# Patient Record
Sex: FEMALE | Race: WHITE | URBAN - METROPOLITAN AREA
[De-identification: names, ages, dates, MRNs, and addresses within clinical notes are randomized per-mention and may not be internally consistent; named-entity substitution may affect disease eponyms.]

---

## 2017-03-02 PROBLEM — D22.61 MELANOCYTIC NEVI OF RIGHT UPPER LIMB, INCLUDING SHOULDER: Status: ACTIVE | Noted: 2017-03-02

## 2017-03-02 PROBLEM — D22.5 MELANOCYTIC NEVI OF TRUNK: Status: ACTIVE | Noted: 2017-03-02

## 2017-03-02 PROBLEM — D22.71 MELANOCYTIC NEVI OF RIGHT LOWER LIMB, INCLUDING HIP: Status: ACTIVE | Noted: 2017-03-02

## 2017-03-02 PROBLEM — D22.72 MELANOCYTIC NEVI OF LEFT LOWER LIMB, INCLUDING HIP: Status: ACTIVE | Noted: 2017-03-02

## 2019-04-01 ENCOUNTER — APPOINTMENT (RX ONLY)
Dept: URBAN - METROPOLITAN AREA CLINIC 4 | Facility: CLINIC | Age: 64
Setting detail: DERMATOLOGY
End: 2019-04-01

## 2019-04-01 DIAGNOSIS — D18.0 HEMANGIOMA: ICD-10-CM

## 2019-04-01 DIAGNOSIS — L82.1 OTHER SEBORRHEIC KERATOSIS: ICD-10-CM

## 2019-04-01 DIAGNOSIS — L81.4 OTHER MELANIN HYPERPIGMENTATION: ICD-10-CM

## 2019-04-01 DIAGNOSIS — D22 MELANOCYTIC NEVI: ICD-10-CM

## 2019-04-01 PROBLEM — D22.71 MELANOCYTIC NEVI OF RIGHT LOWER LIMB, INCLUDING HIP: Status: ACTIVE | Noted: 2019-04-01

## 2019-04-01 PROBLEM — D22.72 MELANOCYTIC NEVI OF LEFT LOWER LIMB, INCLUDING HIP: Status: ACTIVE | Noted: 2019-04-01

## 2019-04-01 PROBLEM — D22.5 MELANOCYTIC NEVI OF TRUNK: Status: ACTIVE | Noted: 2019-04-01

## 2019-04-01 PROBLEM — D22.61 MELANOCYTIC NEVI OF RIGHT UPPER LIMB, INCLUDING SHOULDER: Status: ACTIVE | Noted: 2019-04-01

## 2019-04-01 PROBLEM — D18.01 HEMANGIOMA OF SKIN AND SUBCUTANEOUS TISSUE: Status: ACTIVE | Noted: 2019-04-01

## 2019-04-01 PROBLEM — D23.39 OTHER BENIGN NEOPLASM OF SKIN OF OTHER PARTS OF FACE: Status: ACTIVE | Noted: 2019-04-01

## 2019-04-01 PROBLEM — D22.62 MELANOCYTIC NEVI OF LEFT UPPER LIMB, INCLUDING SHOULDER: Status: ACTIVE | Noted: 2019-04-01

## 2019-04-01 PROCEDURE — 99213 OFFICE O/P EST LOW 20 MIN: CPT

## 2019-04-01 PROCEDURE — ? COUNSELING

## 2019-04-01 PROCEDURE — ? SUNSCREEN RECOMMENDATIONS

## 2019-04-01 ASSESSMENT — LOCATION SIMPLE DESCRIPTION DERM
LOCATION SIMPLE: LEFT HAND
LOCATION SIMPLE: LEFT THIGH
LOCATION SIMPLE: RIGHT THIGH
LOCATION SIMPLE: RIGHT HAND
LOCATION SIMPLE: RIGHT CHEEK
LOCATION SIMPLE: UPPER BACK
LOCATION SIMPLE: ABDOMEN
LOCATION SIMPLE: LEFT ANTERIOR NECK
LOCATION SIMPLE: LEFT FOREARM
LOCATION SIMPLE: LEFT UPPER BACK
LOCATION SIMPLE: RIGHT FOREARM
LOCATION SIMPLE: LEFT CHEEK
LOCATION SIMPLE: RIGHT POSTERIOR UPPER ARM
LOCATION SIMPLE: RIGHT LOWER BACK
LOCATION SIMPLE: LEFT POSTERIOR UPPER ARM

## 2019-04-01 ASSESSMENT — LOCATION DETAILED DESCRIPTION DERM
LOCATION DETAILED: PERIUMBILICAL SKIN
LOCATION DETAILED: LEFT INFERIOR ANTERIOR NECK
LOCATION DETAILED: LEFT SUPERIOR MEDIAL UPPER BACK
LOCATION DETAILED: SUPERIOR THORACIC SPINE
LOCATION DETAILED: RIGHT SUPERIOR MEDIAL MIDBACK
LOCATION DETAILED: RIGHT ANTERIOR PROXIMAL THIGH
LOCATION DETAILED: LEFT PROXIMAL DORSAL FOREARM
LOCATION DETAILED: RIGHT RIB CAGE
LOCATION DETAILED: RIGHT PROXIMAL DORSAL FOREARM
LOCATION DETAILED: RIGHT RADIAL DORSAL HAND
LOCATION DETAILED: LEFT ULNAR DORSAL HAND
LOCATION DETAILED: LEFT PROXIMAL POSTERIOR UPPER ARM
LOCATION DETAILED: LEFT CENTRAL MALAR CHEEK
LOCATION DETAILED: LEFT ANTERIOR PROXIMAL THIGH
LOCATION DETAILED: RIGHT DISTAL POSTERIOR UPPER ARM
LOCATION DETAILED: RIGHT CENTRAL MALAR CHEEK

## 2019-04-01 ASSESSMENT — LOCATION ZONE DERM
LOCATION ZONE: TRUNK
LOCATION ZONE: NECK
LOCATION ZONE: HAND
LOCATION ZONE: LEG
LOCATION ZONE: ARM
LOCATION ZONE: FACE

## 2019-04-01 NOTE — HPI: FULL BODY SKIN EXAMINATION
How Severe Are Your Spot(S)?: moderate
What Type Of Note Output Would You Prefer (Optional)?: Standard Output
What Is The Reason For Today's Visit?: Full Body Skin Examination
What Is The Reason For Today's Visit? (Being Monitored For X): concerning skin lesions on an annual basis
Additional History: Rough spot under left scapula for 5-6 months. FBE.

## 2019-10-08 ENCOUNTER — APPOINTMENT (RX ONLY)
Dept: URBAN - METROPOLITAN AREA CLINIC 4 | Facility: CLINIC | Age: 64
Setting detail: DERMATOLOGY
End: 2019-10-08

## 2019-10-08 DIAGNOSIS — D485 NEOPLASM OF UNCERTAIN BEHAVIOR OF SKIN: ICD-10-CM

## 2019-10-08 PROBLEM — D48.5 NEOPLASM OF UNCERTAIN BEHAVIOR OF SKIN: Status: ACTIVE | Noted: 2019-10-08

## 2019-10-08 PROCEDURE — ? BIOPSY BY SHAVE METHOD

## 2019-10-08 PROCEDURE — 11102 TANGNTL BX SKIN SINGLE LES: CPT

## 2019-10-08 ASSESSMENT — LOCATION DETAILED DESCRIPTION DERM: LOCATION DETAILED: RIGHT LATERAL EYEBROW

## 2019-10-08 ASSESSMENT — LOCATION SIMPLE DESCRIPTION DERM: LOCATION SIMPLE: RIGHT EYEBROW

## 2019-10-08 ASSESSMENT — LOCATION ZONE DERM: LOCATION ZONE: FACE

## 2019-10-08 NOTE — PROCEDURE: BIOPSY BY SHAVE METHOD
Billing Type: Third-Party Bill
Biopsy Method: Personna blade
Hemostasis: Drysol
Render In Bullet Format When Appropriate: No
Curettage Text: The wound bed was treated with curettage after the biopsy was performed.
Dressing: bandage
Was A Bandage Applied: Yes
Silver Nitrate Text: The wound bed was treated with silver nitrate after the biopsy was performed.
Anesthesia Type: 1% lidocaine with epinephrine
Lab Facility: 
Cryotherapy Text: The wound bed was treated with cryotherapy after the biopsy was performed.
Additional Anesthesia Volume In Cc (Will Not Render If 0): 0
Lab: [id number]
Detail Level: Detailed
Post-Care Instructions: I reviewed with the patient in detail post-care instructions. Patient is to keep the biopsy site dry overnight, and then apply bacitracin twice daily until healed. Patient may apply hydrogen peroxide soaks to remove any crusting.
Electrodesiccation Text: The wound bed was treated with electrodesiccation after the biopsy was performed.
X Size Of Lesion In Cm: 0.1
Consent: Written consent was obtained and risks were reviewed including but not limited to scarring, infection, bleeding, scabbing, incomplete removal, nerve damage and allergy to anesthesia.
Depth Of Biopsy: dermis
Notification Instructions: Patient will be notified of biopsy results. However, patient instructed to call the office if not contacted within 2 weeks.
Size Of Lesion In Cm: 0.2
Wound Care: Vaseline
Biopsy Type: H and E
Electrodesiccation And Curettage Text: The wound bed was treated with electrodesiccation and curettage after the biopsy was performed.
Anesthesia Volume In Cc: 2
Type Of Destruction Used: Curettage

## 2019-10-08 NOTE — HPI: SKIN LESION
Is This A New Presentation, Or A Follow-Up?: Skin Lesion
What Type Of Note Output Would You Prefer (Optional)?: Bullet Format
How Severe Is Your Skin Lesion?: moderate
Has Your Skin Lesion Been Treated?: not been treated
Additional History: Patient presents with concerns about a lesion above her right eyebrow. She states it is painful, bumpy. Has been there for a year. Has a history of BCC Dorsal nose 2016.

## 2019-11-05 ENCOUNTER — APPOINTMENT (RX ONLY)
Dept: URBAN - METROPOLITAN AREA CLINIC 4 | Facility: CLINIC | Age: 64
Setting detail: DERMATOLOGY
End: 2019-11-05

## 2019-11-05 DIAGNOSIS — L57.0 ACTINIC KERATOSIS: ICD-10-CM

## 2019-11-05 PROCEDURE — ? COUNSELING

## 2019-11-05 PROCEDURE — 99212 OFFICE O/P EST SF 10 MIN: CPT

## 2019-11-05 ASSESSMENT — LOCATION ZONE DERM: LOCATION ZONE: FACE

## 2019-11-05 ASSESSMENT — LOCATION SIMPLE DESCRIPTION DERM: LOCATION SIMPLE: RIGHT EYEBROW

## 2019-11-05 ASSESSMENT — LOCATION DETAILED DESCRIPTION DERM: LOCATION DETAILED: RIGHT LATERAL EYEBROW

## 2019-11-05 NOTE — PROCEDURE: COUNSELING
Detail Level: Zone
Patient Specific Counseling (Will Not Stick From Patient To Patient): Site is healing well. There is some residual scale. We will treat with LN2.

## 2021-07-05 ENCOUNTER — APPOINTMENT (RX ONLY)
Dept: URBAN - METROPOLITAN AREA CLINIC 4 | Facility: CLINIC | Age: 66
Setting detail: DERMATOLOGY
End: 2021-07-05

## 2021-07-05 DIAGNOSIS — Z71.89 OTHER SPECIFIED COUNSELING: ICD-10-CM

## 2021-07-05 DIAGNOSIS — L82.0 INFLAMED SEBORRHEIC KERATOSIS: ICD-10-CM

## 2021-07-05 DIAGNOSIS — L57.0 ACTINIC KERATOSIS: ICD-10-CM

## 2021-07-05 DIAGNOSIS — L81.4 OTHER MELANIN HYPERPIGMENTATION: ICD-10-CM

## 2021-07-05 DIAGNOSIS — D18.0 HEMANGIOMA: ICD-10-CM

## 2021-07-05 DIAGNOSIS — L82.1 OTHER SEBORRHEIC KERATOSIS: ICD-10-CM

## 2021-07-05 DIAGNOSIS — D22 MELANOCYTIC NEVI: ICD-10-CM

## 2021-07-05 PROBLEM — D22.9 MELANOCYTIC NEVI, UNSPECIFIED: Status: ACTIVE | Noted: 2021-07-05

## 2021-07-05 PROBLEM — D18.01 HEMANGIOMA OF SKIN AND SUBCUTANEOUS TISSUE: Status: ACTIVE | Noted: 2021-07-05

## 2021-07-05 PROCEDURE — ? LIQUID NITROGEN

## 2021-07-05 PROCEDURE — 17003 DESTRUCT PREMALG LES 2-14: CPT

## 2021-07-05 PROCEDURE — ? LIQUID NITROGEN (COSMETIC)

## 2021-07-05 PROCEDURE — 17000 DESTRUCT PREMALG LESION: CPT

## 2021-07-05 PROCEDURE — ? SUNSCREEN RECOMMENDATIONS

## 2021-07-05 PROCEDURE — 99213 OFFICE O/P EST LOW 20 MIN: CPT | Mod: 25

## 2021-07-05 PROCEDURE — ? COUNSELING

## 2021-07-05 ASSESSMENT — LOCATION SIMPLE DESCRIPTION DERM
LOCATION SIMPLE: RIGHT HAND
LOCATION SIMPLE: CHEST
LOCATION SIMPLE: LEFT SCALP
LOCATION SIMPLE: POSTERIOR SCALP
LOCATION SIMPLE: RIGHT CLAVICULAR SKIN
LOCATION SIMPLE: LEFT CHEEK
LOCATION SIMPLE: LEFT FOREHEAD

## 2021-07-05 ASSESSMENT — LOCATION ZONE DERM
LOCATION ZONE: FACE
LOCATION ZONE: SCALP
LOCATION ZONE: TRUNK
LOCATION ZONE: HAND

## 2021-07-05 ASSESSMENT — LOCATION DETAILED DESCRIPTION DERM
LOCATION DETAILED: LEFT MEDIAL FRONTAL SCALP
LOCATION DETAILED: UPPER STERNUM
LOCATION DETAILED: LEFT CENTRAL MALAR CHEEK
LOCATION DETAILED: LEFT LATERAL SUPERIOR CHEST
LOCATION DETAILED: RIGHT CLAVICULAR SKIN
LOCATION DETAILED: LEFT SUPERIOR FOREHEAD
LOCATION DETAILED: RIGHT OCCIPITAL SCALP
LOCATION DETAILED: RIGHT RADIAL DORSAL HAND

## 2021-07-05 NOTE — PROCEDURE: LIQUID NITROGEN (COSMETIC)
Render Post-Care Instructions In Note?: no
Consent: The patient's consent was obtained including but not limited to risks of crusting, scabbing, blistering, scarring, darker or lighter pigmentary change, recurrence, incomplete removal and infection. The patient understands that the procedure is cosmetic in nature and is not covered by insurance.
Detail Level: Detailed
Billing Information: Bill by Static Price
Post-Care Instructions: I reviewed with the patient in detail post-care instructions. Patient is to wear sunprotection, and avoid picking at any of the treated lesions. Pt may apply Vaseline to crusted or scabbing areas.

## 2021-09-22 ENCOUNTER — APPOINTMENT (RX ONLY)
Dept: URBAN - METROPOLITAN AREA CLINIC 20 | Facility: CLINIC | Age: 66
Setting detail: DERMATOLOGY
End: 2021-09-22

## 2021-09-22 DIAGNOSIS — L57.0 ACTINIC KERATOSIS: ICD-10-CM

## 2021-09-22 PROCEDURE — 17000 DESTRUCT PREMALG LESION: CPT

## 2021-09-22 PROCEDURE — ? LIQUID NITROGEN

## 2021-09-22 PROCEDURE — ? COUNSELING

## 2021-09-22 PROCEDURE — 17003 DESTRUCT PREMALG LES 2-14: CPT

## 2021-09-22 ASSESSMENT — LOCATION ZONE DERM: LOCATION ZONE: FACE

## 2021-09-22 ASSESSMENT — LOCATION DETAILED DESCRIPTION DERM
LOCATION DETAILED: RIGHT LATERAL EYEBROW
LOCATION DETAILED: LEFT FOREHEAD

## 2021-09-22 ASSESSMENT — LOCATION SIMPLE DESCRIPTION DERM
LOCATION SIMPLE: LEFT FOREHEAD
LOCATION SIMPLE: RIGHT EYEBROW

## 2021-09-22 NOTE — PROCEDURE: LIQUID NITROGEN
Consent: The patient's consent was obtained including but not limited to risks of crusting, scabbing, blistering, scarring, darker or lighter pigmentary change, recurrence, incomplete removal and infection.
Detail Level: Detailed
Render Note In Bullet Format When Appropriate: No
Duration Of Freeze Thaw-Cycle (Seconds): 0
Post-Care Instructions: I reviewed with the patient in detail post-care instructions. Patient is to wear sunprotection, and avoid picking at any of the treated lesions. Pt may apply Vaseline to crusted or scabbing areas.
Show Aperture Variable?: Yes

## 2021-09-22 NOTE — HPI: SKIN LESIONS
Is This A New Presentation, Or A Follow-Up?: Follow Up Skin Lesions
How Severe Is Your Skin Lesion?: mild
Have Your Skin Lesions Been Treated?: been treated
Which Family Member (Optional)?: Sister

## 2021-11-19 ENCOUNTER — TELEPHONE (OUTPATIENT)
Dept: HEALTH INFORMATION MANAGEMENT | Facility: OTHER | Age: 66
End: 2021-11-19

## 2022-02-23 ENCOUNTER — TELEPHONE (OUTPATIENT)
Dept: SCHEDULING | Facility: IMAGING CENTER | Age: 67
End: 2022-02-23

## 2022-03-11 ENCOUNTER — OFFICE VISIT (OUTPATIENT)
Dept: MEDICAL GROUP | Facility: PHYSICIAN GROUP | Age: 67
End: 2022-03-11
Payer: MEDICARE

## 2022-03-11 VITALS
HEART RATE: 75 BPM | SYSTOLIC BLOOD PRESSURE: 130 MMHG | WEIGHT: 153.2 LBS | BODY MASS INDEX: 23.22 KG/M2 | HEIGHT: 68 IN | DIASTOLIC BLOOD PRESSURE: 80 MMHG | TEMPERATURE: 97.7 F | OXYGEN SATURATION: 100 % | RESPIRATION RATE: 15 BRPM

## 2022-03-11 DIAGNOSIS — Z13.220 SCREENING CHOLESTEROL LEVEL: ICD-10-CM

## 2022-03-11 DIAGNOSIS — Z86.2 H/O SARCOIDOSIS: ICD-10-CM

## 2022-03-11 DIAGNOSIS — G44.049 CHRONIC PAROXYSMAL HEMICRANIA, NOT INTRACTABLE: ICD-10-CM

## 2022-03-11 DIAGNOSIS — Z23 NEED FOR VACCINATION: ICD-10-CM

## 2022-03-11 DIAGNOSIS — I10 ESSENTIAL HYPERTENSION: ICD-10-CM

## 2022-03-11 DIAGNOSIS — E55.9 VITAMIN D DEFICIENCY: ICD-10-CM

## 2022-03-11 DIAGNOSIS — H53.9 VISUAL CHANGES: ICD-10-CM

## 2022-03-11 PROCEDURE — 90732 PPSV23 VACC 2 YRS+ SUBQ/IM: CPT | Performed by: INTERNAL MEDICINE

## 2022-03-11 PROCEDURE — G0009 ADMIN PNEUMOCOCCAL VACCINE: HCPCS | Performed by: INTERNAL MEDICINE

## 2022-03-11 PROCEDURE — 99203 OFFICE O/P NEW LOW 30 MIN: CPT | Mod: 25 | Performed by: INTERNAL MEDICINE

## 2022-03-11 RX ORDER — LOSARTAN POTASSIUM 100 MG/1
100 TABLET ORAL
COMMUNITY
Start: 2022-01-31 | End: 2022-05-03 | Stop reason: SDUPTHER

## 2022-03-11 RX ORDER — LOSARTAN POTASSIUM 100 MG/1
TABLET ORAL
COMMUNITY
Start: 2021-11-04 | End: 2022-03-11

## 2022-03-11 ASSESSMENT — PATIENT HEALTH QUESTIONNAIRE - PHQ9: CLINICAL INTERPRETATION OF PHQ2 SCORE: 0

## 2022-03-11 NOTE — LETTER
[facility]  Radha Guerrero M.D.  [address]  [place] [address]  Fax: [phone]   Authorization for Release/Disclosure of   Protected Health Information   Name: YAZ LANIER : 1955 SSN: [id number]   Address: [address] [place] [address]  [place] [address] Phone:    [phone] (home)    I authorize the entity listed below to release/disclose the PHI below to:   [facility]/Radha Guerrero M.D. and Radha Guerrero M.D.   Provider or Entity Name:  [facility]    Address   City, State, Zip   Phone:      Fax:     Reason for request: continuity of care   Information to be released:    [  ] LAST COLONOSCOPY,  including any PATH REPORT and follow-up  [  ] LAST FIT/COLOGUARD RESULT [  ] LAST DEXA  [  ] LAST MAMMOGRAM  [  ] LAST PAP  [  ] LAST LABS [  ] RETINA EXAM REPORT  [  ] IMMUNIZATION RECORDS  [ xxx ] Release all info      [  ] Check here and initial the line next to each item to release ALL health information INCLUDING  _____ Care and treatment for drug and / or alcohol abuse  _____ HIV testing, infection status, or AIDS  _____ Genetic Testing    DATES OF SERVICE OR TIME PERIOD TO BE DISCLOSED: _____________  I understand and acknowledge that:  * This Authorization may be revoked at any time by you in writing, except if your health information has already been used or disclosed.  * Your health information that will be used or disclosed as a result of you signing this authorization could be re-disclosed by the recipient. If this occurs, your re-disclosed health information may no longer be protected by State or Federal laws.  * You may refuse to sign this Authorization. Your refusal will not affect your ability to obtain treatment.  * This Authorization becomes effective upon signing and will  on (date) __________.      If no date is indicated, this Authorization will  one (1) year from the signature date.    Name: Yaz Lanier    Signature:Continuity of care   Date:      3/11/2022       PLEASE FAX REQUESTED RECORDS BACK TO: [phone]

## 2022-03-25 ENCOUNTER — HOSPITAL ENCOUNTER (OUTPATIENT)
Dept: LAB | Facility: MEDICAL CENTER | Age: 67
End: 2022-03-25
Attending: INTERNAL MEDICINE
Payer: MEDICARE

## 2022-03-25 DIAGNOSIS — E55.9 VITAMIN D DEFICIENCY: ICD-10-CM

## 2022-03-25 DIAGNOSIS — I10 ESSENTIAL HYPERTENSION: ICD-10-CM

## 2022-03-25 DIAGNOSIS — G44.049 CHRONIC PAROXYSMAL HEMICRANIA, NOT INTRACTABLE: ICD-10-CM

## 2022-03-25 DIAGNOSIS — Z13.220 SCREENING CHOLESTEROL LEVEL: ICD-10-CM

## 2022-03-25 LAB
25(OH)D3 SERPL-MCNC: 50 NG/ML (ref 30–100)
ALBUMIN SERPL BCP-MCNC: 4.7 G/DL (ref 3.2–4.9)
ALBUMIN/GLOB SERPL: 1.7 G/DL
ALP SERPL-CCNC: 70 U/L (ref 30–99)
ALT SERPL-CCNC: 37 U/L (ref 2–50)
ANION GAP SERPL CALC-SCNC: 12 MMOL/L (ref 7–16)
AST SERPL-CCNC: 25 U/L (ref 12–45)
BASOPHILS # BLD AUTO: 1.2 % (ref 0–1.8)
BASOPHILS # BLD: 0.07 K/UL (ref 0–0.12)
BILIRUB SERPL-MCNC: 0.5 MG/DL (ref 0.1–1.5)
BUN SERPL-MCNC: 20 MG/DL (ref 8–22)
CALCIUM SERPL-MCNC: 9.6 MG/DL (ref 8.4–10.2)
CHLORIDE SERPL-SCNC: 104 MMOL/L (ref 96–112)
CHOLEST SERPL-MCNC: 195 MG/DL (ref 100–199)
CO2 SERPL-SCNC: 26 MMOL/L (ref 20–33)
CREAT SERPL-MCNC: 0.85 MG/DL (ref 0.5–1.4)
CRP SERPL HS-MCNC: 0.48 MG/DL (ref 0–0.75)
EOSINOPHIL # BLD AUTO: 0.21 K/UL (ref 0–0.51)
EOSINOPHIL NFR BLD: 3.5 % (ref 0–6.9)
ERYTHROCYTE [DISTWIDTH] IN BLOOD BY AUTOMATED COUNT: 41.2 FL (ref 35.9–50)
ERYTHROCYTE [SEDIMENTATION RATE] IN BLOOD BY WESTERGREN METHOD: 17 MM/HOUR (ref 0–25)
FASTING STATUS PATIENT QL REPORTED: NORMAL
GFR SERPLBLD CREATININE-BSD FMLA CKD-EPI: 76 ML/MIN/1.73 M 2
GLOBULIN SER CALC-MCNC: 2.7 G/DL (ref 1.9–3.5)
GLUCOSE SERPL-MCNC: 117 MG/DL (ref 65–99)
HCT VFR BLD AUTO: 44.5 % (ref 37–47)
HDLC SERPL-MCNC: 62 MG/DL
HGB BLD-MCNC: 14.6 G/DL (ref 12–16)
IMM GRANULOCYTES # BLD AUTO: 0.02 K/UL (ref 0–0.11)
IMM GRANULOCYTES NFR BLD AUTO: 0.3 % (ref 0–0.9)
LDLC SERPL CALC-MCNC: 114 MG/DL
LYMPHOCYTES # BLD AUTO: 2.15 K/UL (ref 1–4.8)
LYMPHOCYTES NFR BLD: 35.5 % (ref 22–41)
MCH RBC QN AUTO: 30 PG (ref 27–33)
MCHC RBC AUTO-ENTMCNC: 32.8 G/DL (ref 33.6–35)
MCV RBC AUTO: 91.4 FL (ref 81.4–97.8)
MONOCYTES # BLD AUTO: 0.45 K/UL (ref 0–0.85)
MONOCYTES NFR BLD AUTO: 7.4 % (ref 0–13.4)
NEUTROPHILS # BLD AUTO: 3.16 K/UL (ref 2–7.15)
NEUTROPHILS NFR BLD: 52.1 % (ref 44–72)
NRBC # BLD AUTO: 0 K/UL
NRBC BLD-RTO: 0 /100 WBC
PLATELET # BLD AUTO: 301 K/UL (ref 164–446)
PMV BLD AUTO: 9.8 FL (ref 9–12.9)
POTASSIUM SERPL-SCNC: 4 MMOL/L (ref 3.6–5.5)
PROT SERPL-MCNC: 7.4 G/DL (ref 6–8.2)
RBC # BLD AUTO: 4.87 M/UL (ref 4.2–5.4)
SODIUM SERPL-SCNC: 142 MMOL/L (ref 135–145)
TRIGL SERPL-MCNC: 94 MG/DL (ref 0–149)
TSH SERPL DL<=0.005 MIU/L-ACNC: 0.81 UIU/ML (ref 0.38–5.33)
WBC # BLD AUTO: 6.1 K/UL (ref 4.8–10.8)

## 2022-03-25 PROCEDURE — 80061 LIPID PANEL: CPT

## 2022-03-25 PROCEDURE — 85652 RBC SED RATE AUTOMATED: CPT

## 2022-03-25 PROCEDURE — 82306 VITAMIN D 25 HYDROXY: CPT

## 2022-03-25 PROCEDURE — 36415 COLL VENOUS BLD VENIPUNCTURE: CPT

## 2022-03-25 PROCEDURE — 86140 C-REACTIVE PROTEIN: CPT

## 2022-03-25 PROCEDURE — 85025 COMPLETE CBC W/AUTO DIFF WBC: CPT

## 2022-03-25 PROCEDURE — 80053 COMPREHEN METABOLIC PANEL: CPT

## 2022-03-25 PROCEDURE — 84443 ASSAY THYROID STIM HORMONE: CPT

## 2022-04-05 ENCOUNTER — OFFICE VISIT (OUTPATIENT)
Dept: MEDICAL GROUP | Facility: PHYSICIAN GROUP | Age: 67
End: 2022-04-05
Payer: MEDICARE

## 2022-04-05 VITALS
TEMPERATURE: 97 F | BODY MASS INDEX: 22.87 KG/M2 | HEART RATE: 92 BPM | SYSTOLIC BLOOD PRESSURE: 130 MMHG | RESPIRATION RATE: 15 BRPM | DIASTOLIC BLOOD PRESSURE: 70 MMHG | HEIGHT: 68 IN | OXYGEN SATURATION: 98 % | WEIGHT: 150.9 LBS

## 2022-04-05 DIAGNOSIS — I10 ESSENTIAL HYPERTENSION: ICD-10-CM

## 2022-04-05 DIAGNOSIS — G44.049 CHRONIC PAROXYSMAL HEMICRANIA, NOT INTRACTABLE: ICD-10-CM

## 2022-04-05 DIAGNOSIS — R73.9 HYPERGLYCEMIA: ICD-10-CM

## 2022-04-05 LAB
HBA1C MFR BLD: 6 % (ref 0–5.6)
INT CON NEG: NEGATIVE
INT CON POS: POSITIVE

## 2022-04-05 PROCEDURE — 83036 HEMOGLOBIN GLYCOSYLATED A1C: CPT | Performed by: INTERNAL MEDICINE

## 2022-04-05 PROCEDURE — 99214 OFFICE O/P EST MOD 30 MIN: CPT | Performed by: INTERNAL MEDICINE

## 2022-04-05 RX ORDER — HYDROCHLOROTHIAZIDE 12.5 MG/1
12.5 TABLET ORAL DAILY
Qty: 100 TABLET | Refills: 0 | Status: SHIPPED | OUTPATIENT
Start: 2022-04-05 | End: 2022-06-07

## 2022-04-05 ASSESSMENT — FIBROSIS 4 INDEX: FIB4 SCORE: 0.89

## 2022-04-05 NOTE — PROGRESS NOTES
CC: Follow-up lab work    HPI:  Yaz presents with the following    1. Chronic paroxysmal hemicrania, not intractable  Patient has a complaints of chronic headache that started about 1 year ago.  Patient denies a history of migraines or seizures.  Patient completed brain MRI which was negative.  Possible etiology from her cervical spine, therefore she completed 2 months of physical therapy.  Therapy did not resolve her complaints.  She describes the headache as frontal in between her eyes.  Headaches are most prominent upon waking up in the morning.  She believes she keeps well-hydrated.  No issues in the past with hypoglycemia.  Headaches resolve with over-the-counter pain relievers.  Patient denies any chest pain, shortness of breath, dizziness, palpitations or other focal neurologic symptoms.  Patient does snore at nighttime but states that she gets restful sleep.  She has never been evaluated by neurology.  Patient denies any sinus or allergy symptoms.  Patient saw her ophthalmologist and was diagnosed with possible macular degeneration.     4/5/2022:.  Mild to moderate headache still persists.  Patient believes it is secondary to receiving new glasses.  Patient has a follow-up appointment with ophthalmology on April 25.  CBC, CMP, lipid panel, vitamin D, thyroid function panel, ESR within good range    2. Essential hypertension  Patient currently takes Cozaar 100 mg tablets daily.  Patient has been keeping a blood pressure log.  Average blood pressure 145/70-80.     3. Hyperglycemia  Fasting blood sugar noted 197.  No PMH of DM.  Hemoglobin A1c 6.0.      Patient Active Problem List    Diagnosis Date Noted   • Essential hypertension 03/11/2022   • Chronic paroxysmal hemicrania, not intractable 03/11/2022   • H/O sarcoidosis 03/11/2022   • Vitamin D deficiency 03/11/2022   • Visual changes 03/11/2022       Current Outpatient Medications   Medication Sig Dispense Refill   • hydroCHLOROthiazide (HYDRODIURIL)  "12.5 MG tablet Take 1 Tablet by mouth every day. 100 Tablet 0   • losartan (COZAAR) 100 MG Tab Take 100 mg by mouth every day.       No current facility-administered medications for this visit.         Allergies as of 04/05/2022 - Reviewed 04/05/2022   Allergen Reaction Noted   • Iodine Hives 03/11/2022   • Penicillins Hives 03/11/2022        Social History     Socioeconomic History   • Marital status:      Spouse name: Not on file   • Number of children: Not on file   • Years of education: Not on file   • Highest education level: Not on file   Occupational History   • Not on file   Tobacco Use   • Smoking status: Never Smoker   • Smokeless tobacco: Never Used   Vaping Use   • Vaping Use: Never used   Substance and Sexual Activity   • Alcohol use: Yes     Comment: OCC   • Drug use: Never   • Sexual activity: Not on file   Other Topics Concern   • Not on file   Social History Narrative   • Not on file     Social Determinants of Health     Financial Resource Strain: Not on file   Food Insecurity: Not on file   Transportation Needs: Not on file   Physical Activity: Not on file   Stress: Not on file   Social Connections: Not on file   Intimate Partner Violence: Not on file   Housing Stability: Not on file       History reviewed. No pertinent family history.    History reviewed. No pertinent surgical history.    ROS: Positive ROS per HPI.  Denies Chest pain,  Shortness of breath,  Abdominal pain, Changes of bowel or bladder, Lower ext edema, Fevers, Nights sweats, Weight Changes, Focal weakness or numbness.  All other systems are negative.    /70 (BP Location: Right arm, Patient Position: Sitting, BP Cuff Size: Adult)   Pulse 92   Temp 36.1 °C (97 °F) (Temporal)   Resp 15   Ht 1.727 m (5' 8\")   Wt 68.4 kg (150 lb 14.4 oz)   SpO2 98%   BMI 22.94 kg/m²      Constitutional: Alert, no distress, well-groomed.  Skin: Warm, dry, good turgor, no rashes in visible areas.  Eye: Equal, round and reactive, " conjunctiva clear, lids normal.  ENMT: Lips without lesions, good dentition, moist mucous membranes.  Neck: Trachea midline, no masses, no thyromegaly.  Respiratory: Unlabored respiratory effort, no cough.  Abdomen: Soft, no gross masses.  MSK: Normal gait, moves all extremities.  Neuro: Grossly non-focal. No cranial nerve deficit. Strength and sensation intact.   Psych: Alert and oriented x3, normal affect and mood.      Assessment and Plan.   66 y.o. female presenting with the following.     1. Chronic paroxysmal hemicrania, not intractable  Patient to keep follow-up appointment with ophthalmology.  Better control of hypertension.  Consider neurology referral.    2. Essential hypertension  Continue Cozaar 100 mg tablets daily.  Add hydrochlorothiazide 12.5 mg tablet daily.  Keep blood pressure log.  RTC 6 weeks.    3. Hyperglycemia  Prediabetes counseled patient on diet and lifestyle modifications.  Continue to monitor.    - POCT  A1C    My total time spent caring for the patient on the day of the encounter was 30 minutes.   This does not include time spent on separately billable procedures/tests.

## 2022-04-05 NOTE — PROGRESS NOTES
CC: Establish medical care.  Previous patient of Dr. Neri.    HPI:  Yaz presents with the following    1. Need for vaccination  Due for Pneumovax    2. Essential hypertension  Patient diagnosed with hypertension about 2 years ago.  Currently taking Cozaar 100 mg tablets daily she had side effects of lisinopril with a cough.  Blood pressure has been stable however does not check blood pressure regularly.    3. Chronic paroxysmal hemicrania, not intractable  Patient has a complaints of chronic headache that started about 1 year ago.  Patient denies a history of migraines or seizures.  Patient completed brain MRI which was negative.  Possible etiology from her cervical spine, therefore she completed 2 months of physical therapy.  Therapy did not resolve her complaints.  She describes the headache as frontal in between her eyes.  Headaches are most prominent upon waking up in the morning.  She believes she keeps well-hydrated.  No issues in the past with hypoglycemia.  Headaches resolve with over-the-counter pain relievers.  Patient denies any chest pain, shortness of breath, dizziness, palpitations or other focal neurologic symptoms.  Patient does snore at nighttime but states that she gets restful sleep.  She has never been evaluated by neurology.  Patient denies any sinus or allergy symptoms.  Patient saw her ophthalmologist and was diagnosed with possible macular degeneration.    Patient is due for lab work.    4. H/O sarcoidosis  Patient has a remote history of sarcoidosis which affected her lungs in her early 30s.  Patient clinically resolved with no further issues.  Patient was never treated.    5. Visual changes  Patient would like referral to ophthalmology for second opinion.  Patient initially saw Dr. Mercedes.    6.  Preventative  Patient due for lab work  Up-to-date with mammogram per Dr. Khan, GYN  Up-to-date with bone density completed in the fall 2021.  Colonoscopy completed 2017, 10-year follow-up  1238 Pt arrived to PACU. Pt tearful. Denies pain. Abd soft. No drainage on peripad. 1310 Pt alert and oriented. VSS. Pt comfortable. Scant bloody drainage on peripad. Pt meets discharge criteria. recommended.    Patient Active Problem List    Diagnosis Date Noted   • Essential hypertension 03/11/2022   • Chronic paroxysmal hemicrania, not intractable 03/11/2022   • H/O sarcoidosis 03/11/2022   • Vitamin D deficiency 03/11/2022   • Visual changes 03/11/2022       Current Outpatient Medications   Medication Sig Dispense Refill   • losartan (COZAAR) 100 MG Tab      • losartan (COZAAR) 100 MG Tab Take 100 mg by mouth every day.       No current facility-administered medications for this visit.         Allergies as of 03/11/2022 - Reviewed 03/11/2022   Allergen Reaction Noted   • Iodine Hives 03/11/2022   • Penicillins Hives 03/11/2022        Social History     Socioeconomic History   • Marital status:      Spouse name: Not on file   • Number of children: Not on file   • Years of education: Not on file   • Highest education level: Not on file   Occupational History   • Not on file   Tobacco Use   • Smoking status: Never Smoker   • Smokeless tobacco: Never Used   Vaping Use   • Vaping Use: Never used   Substance and Sexual Activity   • Alcohol use: Yes     Comment: OCC   • Drug use: Never   • Sexual activity: Not on file   Other Topics Concern   • Not on file   Social History Narrative   • Not on file     Social Determinants of Health     Financial Resource Strain: Not on file   Food Insecurity: Not on file   Transportation Needs: Not on file   Physical Activity: Not on file   Stress: Not on file   Social Connections: Not on file   Intimate Partner Violence: Not on file   Housing Stability: Not on file       History reviewed. No pertinent family history.    History reviewed. No pertinent surgical history.    ROS: Positive ROS per HPI.  Denies any Headache,Chest pain,  Shortness of breath,  Abdominal pain, Changes of bowel or bladder, Lower ext edema, Fevers, Nights sweats, Weight Changes, Focal weakness or numbness.  All other systems are negative.    /80 (BP Location: Left arm, Patient Position:  "Sitting, BP Cuff Size: Adult)   Pulse 75   Temp 36.5 °C (97.7 °F) (Temporal)   Resp 15   Ht 1.727 m (5' 8\")   Wt 69.5 kg (153 lb 3.2 oz)   SpO2 100%   BMI 23.29 kg/m²      Constitutional: Alert, no distress, well-groomed.  Skin: Warm, dry, good turgor, no rashes in visible areas.  Eye: Equal, round and reactive, conjunctiva clear, lids normal.  ENMT: Lips without lesions, good dentition, moist mucous membranes.  Neck: Trachea midline, no masses, no thyromegaly.  Respiratory: Unlabored respiratory effort, no cough.  Abdomen: Soft, no gross masses.  MSK: Normal gait, moves all extremities.  Neuro: Grossly non-focal. No cranial nerve deficit. Strength and sensation intact.   Psych: Alert and oriented x3, normal affect and mood.      Assessment and Plan.   66 y.o. female presenting with the following.     1. Need for vaccination    - Pneumovax Vaccine (PPSV23)    2. Essential hypertension  Continue Cozaar 100 mg daily.  Recommend to keep blood pressure log for follow-up visit.    - Comp Metabolic Panel; Future    3. Chronic paroxysmal hemicrania, not intractable  Obtain results of imaging for review.  Complete lab work ordered.  Consider work-up SYMONE  Consider neurology referral  Referral to ophthalmology    - CBC WITH DIFFERENTIAL; Future  - TSH; Future  - Sed Rate; Future  - CRP QUANTITIVE (NON-CARDIAC); Future    4. H/O sarcoidosis  Resolved.  Continue to monitor    5. Vitamin D deficiency    - VITAMIN D,25 HYDROXY; Future    6. Screening cholesterol level    - Lipid Profile; Future    7. Visual changes    - Referral to Ophthalmology    My total time spent caring for the patient on the day of the encounter was 30 minutes.   This does not include time spent on separately billable procedures/tests.    "

## 2022-04-14 ENCOUNTER — PATIENT MESSAGE (OUTPATIENT)
Dept: HEALTH INFORMATION MANAGEMENT | Facility: OTHER | Age: 67
End: 2022-04-14

## 2022-04-26 ENCOUNTER — TELEPHONE (OUTPATIENT)
Dept: MEDICAL GROUP | Facility: PHYSICIAN GROUP | Age: 67
End: 2022-04-26
Payer: MEDICARE

## 2022-04-26 NOTE — TELEPHONE ENCOUNTER
VOICEMAIL  1. Caller Name: Yaz Naylor                        Call Back Number: [phone] (home)       2. Message: Pt called and LVM.Pt stopped taking Diuretic RX. Pt mentioned medication not making her feel well, pt took it for one week. Pt wanted to let pcp know.     3. Patient approves office to leave a detailed voicemail/MyChart message: N\A

## 2022-05-03 ENCOUNTER — PATIENT MESSAGE (OUTPATIENT)
Dept: MEDICAL GROUP | Facility: PHYSICIAN GROUP | Age: 67
End: 2022-05-03
Payer: MEDICARE

## 2022-05-03 RX ORDER — LOSARTAN POTASSIUM 100 MG/1
100 TABLET ORAL
Qty: 100 TABLET | Refills: 0 | Status: SHIPPED | OUTPATIENT
Start: 2022-05-03 | End: 2022-08-10

## 2022-05-04 NOTE — TELEPHONE ENCOUNTER
Requested Prescriptions     Pending Prescriptions Disp Refills   • losartan (COZAAR) 100 MG Tab 100 Tablet 0     Sig: Take 1 Tablet by mouth every day.   Caryn Browning M.D.

## 2022-06-07 ENCOUNTER — OFFICE VISIT (OUTPATIENT)
Dept: MEDICAL GROUP | Facility: PHYSICIAN GROUP | Age: 67
End: 2022-06-07
Payer: MEDICARE

## 2022-06-07 VITALS
DIASTOLIC BLOOD PRESSURE: 80 MMHG | BODY MASS INDEX: 23.67 KG/M2 | OXYGEN SATURATION: 97 % | TEMPERATURE: 97.9 F | HEIGHT: 68 IN | HEART RATE: 75 BPM | SYSTOLIC BLOOD PRESSURE: 138 MMHG | RESPIRATION RATE: 14 BRPM | WEIGHT: 156.2 LBS

## 2022-06-07 DIAGNOSIS — R73.03 PREDIABETES: ICD-10-CM

## 2022-06-07 DIAGNOSIS — I10 ESSENTIAL HYPERTENSION: ICD-10-CM

## 2022-06-07 DIAGNOSIS — Z12.31 ENCOUNTER FOR SCREENING MAMMOGRAM FOR MALIGNANT NEOPLASM OF BREAST: ICD-10-CM

## 2022-06-07 DIAGNOSIS — G44.049 CHRONIC PAROXYSMAL HEMICRANIA, NOT INTRACTABLE: ICD-10-CM

## 2022-06-07 PROCEDURE — 99214 OFFICE O/P EST MOD 30 MIN: CPT | Performed by: INTERNAL MEDICINE

## 2022-06-07 ASSESSMENT — FIBROSIS 4 INDEX: FIB4 SCORE: 0.89

## 2022-06-07 NOTE — PROGRESS NOTES
CC: Follow-up headaches    HPI:  Yaz presents with the following    1. Chronic paroxysmal hemicrania, not intractable  Patient has a complaints of chronic headache that started about 1 year ago.  Patient denies a history of migraines or seizures.  Patient completed brain MRI in 8/ 2021 which was negative.  Possible etiology from her cervical spine, therefore she completed 2 months of physical therapy.  Therapy did not resolve her complaints.  She describes the headache as frontal in between her eyes.  Headaches are most prominent upon waking up in the morning.  She believes she keeps well-hydrated.  No issues in the past with hypoglycemia.  Headaches resolve with over-the-counter pain relievers.  Patient denies any chest pain, shortness of breath, dizziness, palpitations or other focal neurologic symptoms.  Patient does snore at nighttime but states that she gets restful sleep.  She has never been evaluated by neurology.  Patient denies any sinus or allergy symptoms.  Patient saw her ophthalmologist and was diagnosed with possible macular degeneration.     4/5/2022:.  Mild to moderate headache still persists.  Patient believes it is secondary to receiving new glasses.  Patient has a follow-up appointment with ophthalmology on April 25.  CBC, CMP, lipid panel, vitamin D, thyroid function panel, ESR within good range.    607/22:.  Patient followed up with her ophthalmologist.  Per Dr. Cunningham there is no etiology/issues causing her current symptoms of headache.  Patient plans to be evaluated by a new optometrist as her progressive glasses still may be the cause of her symptoms.    2. Essential hypertension  Patient continues on Cozaar 100 mg tablets daily.  Patient was unable to tolerate hydrochlorothiazide due to side effects of fatigue.  Blood pressure average 130/80.    3. Prediabetes  Hemoglobin A1c 6.0.  No diabetic medications.  Patient tried to eat healthy low-fat/low carbohydrate diet.    4. Encounter  for screening mammogram for malignant neoplasm of breast  Patient is due for mammogram.  Last completed June 2021 at [facility].        Patient Active Problem List    Diagnosis Date Noted   • Prediabetes 06/07/2022   • Essential hypertension 03/11/2022   • Chronic paroxysmal hemicrania, not intractable 03/11/2022   • H/O sarcoidosis 03/11/2022   • Vitamin D deficiency 03/11/2022   • Visual changes 03/11/2022       Current Outpatient Medications   Medication Sig Dispense Refill   • losartan (COZAAR) 100 MG Tab Take 1 Tablet by mouth every day. 100 Tablet 0     No current facility-administered medications for this visit.         Allergies as of 06/07/2022 - Reviewed 06/07/2022   Allergen Reaction Noted   • Iodine Hives 03/11/2022   • Penicillins Hives 03/11/2022        Social History     Socioeconomic History   • Marital status:      Spouse name: Not on file   • Number of children: Not on file   • Years of education: Not on file   • Highest education level: Not on file   Occupational History   • Not on file   Tobacco Use   • Smoking status: Never Smoker   • Smokeless tobacco: Never Used   Vaping Use   • Vaping Use: Never used   Substance and Sexual Activity   • Alcohol use: Yes     Comment: OCC   • Drug use: Never   • Sexual activity: Not on file   Other Topics Concern   • Not on file   Social History Narrative   • Not on file     Social Determinants of Health     Financial Resource Strain: Not on file   Food Insecurity: Not on file   Transportation Needs: Not on file   Physical Activity: Not on file   Stress: Not on file   Social Connections: Not on file   Intimate Partner Violence: Not on file   Housing Stability: Not on file       History reviewed. No pertinent family history.    History reviewed. No pertinent surgical history.    ROS:  Denies any Headache,Chest pain,  Shortness of breath,  Abdominal pain, Changes of bowel or bladder, Lower ext edema, Fevers, Nights sweats, Weight Changes, Focal weakness or  "numbness.  All other systems are negative.    /80 (BP Location: Left arm, Patient Position: Sitting, BP Cuff Size: Adult)   Pulse 75   Temp 36.6 °C (97.9 °F) (Temporal)   Resp 14   Ht 1.727 m (5' 8\")   Wt 70.9 kg (156 lb 3.2 oz)   SpO2 97%   BMI 23.75 kg/m²      Constitutional: Alert, no distress, well-groomed.  Skin: Warm, dry, good turgor, no rashes in visible areas.  Eye: Equal, round and reactive, conjunctiva clear, lids normal.  ENMT: Lips without lesions, good dentition, moist mucous membranes.  Neck: Trachea midline, no masses, no thyromegaly.  Respiratory: Unlabored respiratory effort, no cough.  Abdomen: Soft, no gross masses.  MSK: Normal gait, moves all extremities.  Neuro: Grossly non-focal. No cranial nerve deficit. Strength and sensation intact.   Psych: Alert and oriented x3, normal affect and mood.      Assessment and Plan.   66 y.o. female presenting with the following.     1. Chronic paroxysmal hemicrania, not intractable  Patient to follow-up with optometrist.  If no resolution, consider neurology referral.    2. Essential hypertension  Continue Cozaar 100 mg daily.  Continue to monitor blood pressure regularly.  Low-sodium diet.    3. Prediabetes  Continue to monitor.  Diet and lifestyle modifications.  Recheck hemoglobin A1c in 6 months.    4. Encounter for screening mammogram for malignant neoplasm of breast    - MA-SCREENING MAMMO BILAT W/CAD; Future      My total time spent caring for the patient on the day of the encounter was 31 minutes.   This does not include time spent on separately billable procedures/tests.    "

## 2022-07-06 ENCOUNTER — HOSPITAL ENCOUNTER (OUTPATIENT)
Dept: RADIOLOGY | Facility: MEDICAL CENTER | Age: 67
End: 2022-07-06
Payer: MEDICARE

## 2022-07-13 ENCOUNTER — APPOINTMENT (RX ONLY)
Dept: URBAN - METROPOLITAN AREA CLINIC 6 | Facility: CLINIC | Age: 67
Setting detail: DERMATOLOGY
End: 2022-07-13

## 2022-07-13 DIAGNOSIS — L82.1 OTHER SEBORRHEIC KERATOSIS: ICD-10-CM

## 2022-07-13 DIAGNOSIS — Z71.89 OTHER SPECIFIED COUNSELING: ICD-10-CM

## 2022-07-13 DIAGNOSIS — D18.0 HEMANGIOMA: ICD-10-CM

## 2022-07-13 DIAGNOSIS — L81.4 OTHER MELANIN HYPERPIGMENTATION: ICD-10-CM

## 2022-07-13 DIAGNOSIS — D22 MELANOCYTIC NEVI: ICD-10-CM

## 2022-07-13 PROBLEM — D18.01 HEMANGIOMA OF SKIN AND SUBCUTANEOUS TISSUE: Status: ACTIVE | Noted: 2022-07-13

## 2022-07-13 PROBLEM — D22.62 MELANOCYTIC NEVI OF LEFT UPPER LIMB, INCLUDING SHOULDER: Status: ACTIVE | Noted: 2022-07-13

## 2022-07-13 PROBLEM — D22.71 MELANOCYTIC NEVI OF RIGHT LOWER LIMB, INCLUDING HIP: Status: ACTIVE | Noted: 2022-07-13

## 2022-07-13 PROBLEM — D22.5 MELANOCYTIC NEVI OF TRUNK: Status: ACTIVE | Noted: 2022-07-13

## 2022-07-13 PROBLEM — D22.72 MELANOCYTIC NEVI OF LEFT LOWER LIMB, INCLUDING HIP: Status: ACTIVE | Noted: 2022-07-13

## 2022-07-13 PROBLEM — D22.61 MELANOCYTIC NEVI OF RIGHT UPPER LIMB, INCLUDING SHOULDER: Status: ACTIVE | Noted: 2022-07-13

## 2022-07-13 PROCEDURE — ? COUNSELING

## 2022-07-13 PROCEDURE — 99213 OFFICE O/P EST LOW 20 MIN: CPT

## 2022-07-13 ASSESSMENT — LOCATION SIMPLE DESCRIPTION DERM
LOCATION SIMPLE: RIGHT CALF
LOCATION SIMPLE: LEFT UPPER BACK
LOCATION SIMPLE: CHEST
LOCATION SIMPLE: RIGHT THIGH
LOCATION SIMPLE: RIGHT UPPER ARM
LOCATION SIMPLE: LEFT THIGH
LOCATION SIMPLE: LEFT POSTERIOR THIGH
LOCATION SIMPLE: LEFT FOREARM
LOCATION SIMPLE: RIGHT POSTERIOR THIGH
LOCATION SIMPLE: LEFT CALF
LOCATION SIMPLE: LEFT UPPER ARM
LOCATION SIMPLE: LEFT CHEEK
LOCATION SIMPLE: RIGHT LOWER BACK
LOCATION SIMPLE: RIGHT UPPER BACK
LOCATION SIMPLE: RIGHT FOREARM

## 2022-07-13 ASSESSMENT — LOCATION DETAILED DESCRIPTION DERM
LOCATION DETAILED: RIGHT ANTERIOR PROXIMAL THIGH
LOCATION DETAILED: RIGHT ANTERIOR DISTAL UPPER ARM
LOCATION DETAILED: LEFT ANTERIOR PROXIMAL THIGH
LOCATION DETAILED: LEFT CENTRAL MALAR CHEEK
LOCATION DETAILED: LEFT PROXIMAL DORSAL FOREARM
LOCATION DETAILED: RIGHT SUPERIOR LATERAL MIDBACK
LOCATION DETAILED: LEFT PROXIMAL CALF
LOCATION DETAILED: LEFT ANTERIOR DISTAL UPPER ARM
LOCATION DETAILED: RIGHT PROXIMAL CALF
LOCATION DETAILED: LEFT INFERIOR UPPER BACK
LOCATION DETAILED: RIGHT DISTAL POSTERIOR THIGH
LOCATION DETAILED: LEFT VENTRAL DISTAL FOREARM
LOCATION DETAILED: RIGHT PROXIMAL DORSAL FOREARM
LOCATION DETAILED: RIGHT MEDIAL INFERIOR CHEST
LOCATION DETAILED: RIGHT VENTRAL PROXIMAL FOREARM
LOCATION DETAILED: RIGHT MID-UPPER BACK
LOCATION DETAILED: LEFT DISTAL POSTERIOR THIGH
LOCATION DETAILED: RIGHT VENTRAL DISTAL FOREARM

## 2022-07-13 ASSESSMENT — LOCATION ZONE DERM
LOCATION ZONE: FACE
LOCATION ZONE: ARM
LOCATION ZONE: TRUNK
LOCATION ZONE: LEG

## 2022-07-13 NOTE — PROCEDURE: MIPS QUALITY
Detail Level: Detailed
Quality 130: Documentation Of Current Medications In The Medical Record: Current Medications Documented
Quality 226: Preventive Care And Screening: Tobacco Use: Screening And Cessation Intervention: Patient screened for tobacco use and is an ex/non-smoker
Quality 431: Preventive Care And Screening: Unhealthy Alcohol Use - Screening: Patient identified as an unhealthy alcohol user when screened for unhealthy alcohol use using a systematic screening method and received brief counseling
Quality 111:Pneumonia Vaccination Status For Older Adults: Pneumococcal vaccine administered on or after patient’s 60th birthday and before the end of the measurement period

## 2022-07-25 ENCOUNTER — TELEPHONE (OUTPATIENT)
Dept: HEALTH INFORMATION MANAGEMENT | Facility: OTHER | Age: 67
End: 2022-07-25
Payer: MEDICARE

## 2022-07-27 ENCOUNTER — HOSPITAL ENCOUNTER (OUTPATIENT)
Dept: RADIOLOGY | Facility: MEDICAL CENTER | Age: 67
End: 2022-07-27
Attending: INTERNAL MEDICINE
Payer: MEDICARE

## 2022-07-27 DIAGNOSIS — Z12.31 VISIT FOR SCREENING MAMMOGRAM: ICD-10-CM

## 2022-07-27 PROCEDURE — 77063 BREAST TOMOSYNTHESIS BI: CPT

## 2022-08-10 RX ORDER — LOSARTAN POTASSIUM 100 MG/1
100 TABLET ORAL
Qty: 100 TABLET | Refills: 0 | Status: SHIPPED | OUTPATIENT
Start: 2022-08-10 | End: 2022-11-22

## 2022-09-06 ENCOUNTER — HOSPITAL ENCOUNTER (EMERGENCY)
Facility: MEDICAL CENTER | Age: 67
End: 2022-09-06
Attending: EMERGENCY MEDICINE
Payer: MEDICARE

## 2022-09-06 ENCOUNTER — APPOINTMENT (OUTPATIENT)
Dept: RADIOLOGY | Facility: MEDICAL CENTER | Age: 67
End: 2022-09-06
Attending: EMERGENCY MEDICINE
Payer: MEDICARE

## 2022-09-06 VITALS
SYSTOLIC BLOOD PRESSURE: 135 MMHG | OXYGEN SATURATION: 96 % | DIASTOLIC BLOOD PRESSURE: 70 MMHG | WEIGHT: 147.71 LBS | HEIGHT: 68 IN | TEMPERATURE: 99 F | BODY MASS INDEX: 22.39 KG/M2 | HEART RATE: 87 BPM | RESPIRATION RATE: 17 BRPM

## 2022-09-06 DIAGNOSIS — K57.92 DIVERTICULITIS: ICD-10-CM

## 2022-09-06 LAB
ALBUMIN SERPL BCP-MCNC: 4.7 G/DL (ref 3.2–4.9)
ALBUMIN/GLOB SERPL: 1.4 G/DL
ALP SERPL-CCNC: 74 U/L (ref 30–99)
ALT SERPL-CCNC: 18 U/L (ref 2–50)
ANION GAP SERPL CALC-SCNC: 12 MMOL/L (ref 7–16)
APPEARANCE UR: CLEAR
AST SERPL-CCNC: 18 U/L (ref 12–45)
BASOPHILS # BLD AUTO: 0.4 % (ref 0–1.8)
BASOPHILS # BLD: 0.06 K/UL (ref 0–0.12)
BILIRUB SERPL-MCNC: 0.8 MG/DL (ref 0.1–1.5)
BILIRUB UR QL STRIP.AUTO: NEGATIVE
BUN SERPL-MCNC: 15 MG/DL (ref 8–22)
CALCIUM SERPL-MCNC: 9.6 MG/DL (ref 8.4–10.2)
CHLORIDE SERPL-SCNC: 103 MMOL/L (ref 96–112)
CO2 SERPL-SCNC: 25 MMOL/L (ref 20–33)
COLOR UR: YELLOW
CREAT SERPL-MCNC: 0.79 MG/DL (ref 0.5–1.4)
EOSINOPHIL # BLD AUTO: 0.02 K/UL (ref 0–0.51)
EOSINOPHIL NFR BLD: 0.1 % (ref 0–6.9)
ERYTHROCYTE [DISTWIDTH] IN BLOOD BY AUTOMATED COUNT: 41.7 FL (ref 35.9–50)
GFR SERPLBLD CREATININE-BSD FMLA CKD-EPI: 83 ML/MIN/1.73 M 2
GLOBULIN SER CALC-MCNC: 3.4 G/DL (ref 1.9–3.5)
GLUCOSE SERPL-MCNC: 126 MG/DL (ref 65–99)
GLUCOSE UR STRIP.AUTO-MCNC: NEGATIVE MG/DL
HCT VFR BLD AUTO: 42.6 % (ref 37–47)
HGB BLD-MCNC: 14.6 G/DL (ref 12–16)
IMM GRANULOCYTES # BLD AUTO: 0.05 K/UL (ref 0–0.11)
IMM GRANULOCYTES NFR BLD AUTO: 0.3 % (ref 0–0.9)
KETONES UR STRIP.AUTO-MCNC: NEGATIVE MG/DL
LEUKOCYTE ESTERASE UR QL STRIP.AUTO: NEGATIVE
LIPASE SERPL-CCNC: 13 U/L (ref 7–58)
LYMPHOCYTES # BLD AUTO: 1.2 K/UL (ref 1–4.8)
LYMPHOCYTES NFR BLD: 7.9 % (ref 22–41)
MCH RBC QN AUTO: 30.9 PG (ref 27–33)
MCHC RBC AUTO-ENTMCNC: 34.3 G/DL (ref 33.6–35)
MCV RBC AUTO: 90.1 FL (ref 81.4–97.8)
MICRO URNS: NORMAL
MONOCYTES # BLD AUTO: 1.38 K/UL (ref 0–0.85)
MONOCYTES NFR BLD AUTO: 9.1 % (ref 0–13.4)
NEUTROPHILS # BLD AUTO: 12.44 K/UL (ref 2–7.15)
NEUTROPHILS NFR BLD: 82.2 % (ref 44–72)
NITRITE UR QL STRIP.AUTO: NEGATIVE
NRBC # BLD AUTO: 0 K/UL
NRBC BLD-RTO: 0 /100 WBC
PH UR STRIP.AUTO: 6 [PH] (ref 5–8)
PLATELET # BLD AUTO: 225 K/UL (ref 164–446)
PMV BLD AUTO: 10.7 FL (ref 9–12.9)
POTASSIUM SERPL-SCNC: 3.8 MMOL/L (ref 3.6–5.5)
PROT SERPL-MCNC: 8.1 G/DL (ref 6–8.2)
PROT UR QL STRIP: NEGATIVE MG/DL
RBC # BLD AUTO: 4.73 M/UL (ref 4.2–5.4)
RBC UR QL AUTO: NEGATIVE
SODIUM SERPL-SCNC: 140 MMOL/L (ref 135–145)
SP GR UR STRIP.AUTO: 1.02
WBC # BLD AUTO: 15.2 K/UL (ref 4.8–10.8)

## 2022-09-06 PROCEDURE — 74176 CT ABD & PELVIS W/O CONTRAST: CPT

## 2022-09-06 PROCEDURE — 80053 COMPREHEN METABOLIC PANEL: CPT

## 2022-09-06 PROCEDURE — 700111 HCHG RX REV CODE 636 W/ 250 OVERRIDE (IP): Performed by: EMERGENCY MEDICINE

## 2022-09-06 PROCEDURE — 99285 EMERGENCY DEPT VISIT HI MDM: CPT

## 2022-09-06 PROCEDURE — 700105 HCHG RX REV CODE 258: Performed by: EMERGENCY MEDICINE

## 2022-09-06 PROCEDURE — 83690 ASSAY OF LIPASE: CPT

## 2022-09-06 PROCEDURE — 36415 COLL VENOUS BLD VENIPUNCTURE: CPT

## 2022-09-06 PROCEDURE — 700101 HCHG RX REV CODE 250: Performed by: EMERGENCY MEDICINE

## 2022-09-06 PROCEDURE — 85025 COMPLETE CBC W/AUTO DIFF WBC: CPT

## 2022-09-06 PROCEDURE — 81003 URINALYSIS AUTO W/O SCOPE: CPT

## 2022-09-06 PROCEDURE — 96368 THER/DIAG CONCURRENT INF: CPT

## 2022-09-06 PROCEDURE — 96375 TX/PRO/DX INJ NEW DRUG ADDON: CPT

## 2022-09-06 PROCEDURE — 700111 HCHG RX REV CODE 636 W/ 250 OVERRIDE (IP)

## 2022-09-06 PROCEDURE — 96365 THER/PROPH/DIAG IV INF INIT: CPT

## 2022-09-06 RX ORDER — METRONIDAZOLE 500 MG/1
500 TABLET ORAL 3 TIMES DAILY
Qty: 30 TABLET | Refills: 0 | Status: SHIPPED | OUTPATIENT
Start: 2022-09-06 | End: 2022-09-16

## 2022-09-06 RX ORDER — ONDANSETRON 4 MG/1
TABLET, ORALLY DISINTEGRATING ORAL
Status: DISCONTINUED
Start: 2022-09-06 | End: 2022-09-06 | Stop reason: HOSPADM

## 2022-09-06 RX ORDER — IBUPROFEN 200 MG
400 TABLET ORAL DAILY
Status: SHIPPED | COMMUNITY
End: 2023-10-26

## 2022-09-06 RX ORDER — METRONIDAZOLE 500 MG/100ML
500 INJECTION, SOLUTION INTRAVENOUS ONCE
Status: COMPLETED | OUTPATIENT
Start: 2022-09-06 | End: 2022-09-06

## 2022-09-06 RX ORDER — ONDANSETRON 4 MG/1
4 TABLET, ORALLY DISINTEGRATING ORAL EVERY 8 HOURS PRN
Qty: 20 TABLET | Refills: 0 | Status: SHIPPED | OUTPATIENT
Start: 2022-09-06 | End: 2023-02-06

## 2022-09-06 RX ORDER — DICYCLOMINE HCL 20 MG
20 TABLET ORAL EVERY 6 HOURS
Qty: 20 TABLET | Refills: 0 | Status: SHIPPED | OUTPATIENT
Start: 2022-09-06 | End: 2023-02-06

## 2022-09-06 RX ORDER — ONDANSETRON 4 MG/1
4 TABLET, ORALLY DISINTEGRATING ORAL ONCE
Status: COMPLETED | OUTPATIENT
Start: 2022-09-06 | End: 2022-09-06

## 2022-09-06 RX ORDER — SODIUM CHLORIDE, SODIUM LACTATE, POTASSIUM CHLORIDE, CALCIUM CHLORIDE 600; 310; 30; 20 MG/100ML; MG/100ML; MG/100ML; MG/100ML
1000 INJECTION, SOLUTION INTRAVENOUS ONCE
Status: COMPLETED | OUTPATIENT
Start: 2022-09-06 | End: 2022-09-06

## 2022-09-06 RX ORDER — ONDANSETRON 2 MG/ML
INJECTION INTRAMUSCULAR; INTRAVENOUS
Status: COMPLETED
Start: 2022-09-06 | End: 2022-09-06

## 2022-09-06 RX ORDER — MORPHINE SULFATE 4 MG/ML
4 INJECTION INTRAVENOUS ONCE
Status: COMPLETED | OUTPATIENT
Start: 2022-09-06 | End: 2022-09-06

## 2022-09-06 RX ORDER — CIPROFLOXACIN 2 MG/ML
400 INJECTION, SOLUTION INTRAVENOUS ONCE
Status: COMPLETED | OUTPATIENT
Start: 2022-09-06 | End: 2022-09-06

## 2022-09-06 RX ORDER — CIPROFLOXACIN 500 MG/1
500 TABLET, FILM COATED ORAL 2 TIMES DAILY
Qty: 20 TABLET | Refills: 0 | Status: SHIPPED | OUTPATIENT
Start: 2022-09-06 | End: 2022-09-16

## 2022-09-06 RX ORDER — ONDANSETRON 2 MG/ML
4 INJECTION INTRAMUSCULAR; INTRAVENOUS ONCE
Status: COMPLETED | OUTPATIENT
Start: 2022-09-06 | End: 2022-09-06

## 2022-09-06 RX ADMIN — MORPHINE SULFATE 4 MG: 4 INJECTION INTRAVENOUS at 10:49

## 2022-09-06 RX ADMIN — SODIUM CHLORIDE, POTASSIUM CHLORIDE, SODIUM LACTATE AND CALCIUM CHLORIDE 1000 ML: 600; 310; 30; 20 INJECTION, SOLUTION INTRAVENOUS at 13:11

## 2022-09-06 RX ADMIN — METRONIDAZOLE 500 MG: 500 INJECTION, SOLUTION INTRAVENOUS at 11:39

## 2022-09-06 RX ADMIN — ONDANSETRON 4 MG: 2 INJECTION INTRAMUSCULAR; INTRAVENOUS at 13:11

## 2022-09-06 RX ADMIN — CIPROFLOXACIN 400 MG: 2 INJECTION, SOLUTION INTRAVENOUS at 11:35

## 2022-09-06 RX ADMIN — ONDANSETRON 4 MG: 4 TABLET, ORALLY DISINTEGRATING ORAL at 12:58

## 2022-09-06 ASSESSMENT — FIBROSIS 4 INDEX: FIB4 SCORE: 0.89

## 2022-09-06 NOTE — ED NOTES
"Pt  came running out of RM-9, stated that while the Pt was standing after getting dressed she \"passed out\", this RN came into the room and found Pt laying back down on the gurney, VS taken - appears Pt vagaled down by VS results and appearance, ERP notified - new orders received for IV, fluids and zofran;    Both Pt and spouse denied Pt falling and/or hitting her head, spouse stated that she just fell back into the gurney;      "

## 2022-09-06 NOTE — ED TRIAGE NOTES
Pt amb to triage c/o LLQ pain that radiates to L flank x2d. Pt stase hx of diahrrea last week that is now resolved. Pt tachycardic, sepsis score: 2

## 2022-09-06 NOTE — ED NOTES
Pt medicated for Pn, denies any further needs at this time, aware that she is waiting for CT results, no distress noted;

## 2022-09-06 NOTE — ED PROVIDER NOTES
ED Provider Note    Addendum:    When the patient was putting her clothes on after discharge she became nauseous and had to lay down.  She was given Zofran 4 mg IV and was hydrated with lactated Ringer's.  This improved her symptoms and she was discharged with a prescription for Zofran along with her antibiotics.  The patient was stable at the time of discharge.    Electronically signed by Dr. Dino Hugo

## 2022-09-06 NOTE — ED PROVIDER NOTES
ED Provider Note    CHIEF COMPLAINT  Chief Complaint   Patient presents with    LLQ Pain    Flank Pain       HPI  Yaz Naylor is a 66 y.o. female who presents with left lower quadrant abdominal pain for 24 hours.  The patient said it was very difficult for her to get comfortable last night when she would lay on her side.  She sat up in bed for sleep last night which helped the symptoms.  She says she had diarrhea last week which resolved.  She denies any family or personal history of diverticulitis.  She denies any urinary symptoms.  She describes the pain when it comes as severe.  She took Advil at home which did help the pain somewhat.  The pain is dull and crampy in nature.    REVIEW OF SYSTEMS  See HPI for further details. All other systems are negative.     PAST MEDICAL HISTORY   has a past medical history of Chronic paroxysmal hemicrania, not intractable (3/11/2022), Essential hypertension (3/11/2022), H/O sarcoidosis (3/11/2022), Prediabetes (6/7/2022), Visual changes (3/11/2022), and Vitamin D deficiency (3/11/2022).    SOCIAL HISTORY  Social History     Tobacco Use    Smoking status: Never    Smokeless tobacco: Never   Vaping Use    Vaping Use: Never used   Substance and Sexual Activity    Alcohol use: Yes     Comment: OCC    Drug use: Never    Sexual activity: Not on file       SURGICAL HISTORY  patient denies any surgical history    CURRENT MEDICATIONS  Home Medications       Reviewed by Zachary Clements (Pharmacy Tech) on 09/06/22 at 0912  Med List Status: Complete     Medication Last Dose Status   CALCIUM PO 9/6/2022 Active   ibuprofen (MOTRIN) 200 MG Tab 9/6/2022 Active   losartan (COZAAR) 100 MG Tab 9/6/2022 Active   multivitamin (THERAGRAN) Tab 9/6/2022 Active   Omega-3 Fatty Acids (OMEGA 3 PO) 9/6/2022 Active                    ALLERGIES  Allergies   Allergen Reactions    Iodine Hives    Penicillins Hives       FAMILY HISTORY  No pertinent family history    PHYSICAL EXAM  VITAL SIGNS: BP  "(!) 147/75   Pulse 90   Temp 37.5 °C (99.5 °F) (Temporal)   Resp 16   Ht 1.727 m (5' 8\")   Wt 67 kg (147 lb 11.3 oz)   SpO2 93%   BMI 22.46 kg/m²  @ROSEY[[id number]::@   Pulse ox interpretation: I interpret this pulse ox as normal.  Constitutional: Alert.  HENT: No signs of trauma, Bilateral external ears normal, Nose normal.   Eyes: Pupils are equal and reactive, Conjunctiva normal, Non-icteric.   Neck: Normal range of motion, No tenderness, Supple, No stridor.   Lymphatic: No lymphadenopathy noted.   Cardiovascular: Regular rate and rhythm, no murmurs.   Thorax & Lungs: Normal breath sounds, No respiratory distress, No wheezing, No chest tenderness.   Abdomen: Tenderness of the left lower quadrant.  No peritoneal signs.  Skin: Warm, Dry, No erythema, No rash.   Back: No bony tenderness, No CVA tenderness.   Extremities: Intact distal pulses, No edema, No tenderness, No cyanosis.  Musculoskeletal: Good range of motion in all major joints. No tenderness to palpation or major deformities noted.   Neurologic: Alert , Normal motor function, Normal sensory function, No focal deficits noted.   Psychiatric: Affect normal, Judgment normal, Mood normal.       DIAGNOSTIC STUDIES / PROCEDURES        LABS  Labs Reviewed   CBC WITH DIFFERENTIAL - Abnormal; Notable for the following components:       Result Value    WBC 15.2 (*)     Neutrophils-Polys 82.20 (*)     Lymphocytes 7.90 (*)     Neutrophils (Absolute) 12.44 (*)     Monos (Absolute) 1.38 (*)     All other components within normal limits   COMP METABOLIC PANEL - Abnormal; Notable for the following components:    Glucose 126 (*)     All other components within normal limits   LIPASE   URINALYSIS   ESTIMATED GFR         RADIOLOGY  CT-RENAL COLIC EVALUATION(A/P W/O)   Final Result      1.  Sigmoid diverticulitis.      2.  No evidence of renal or ureteral stone or hydronephrosis.      3.  Hepatic and renal cysts.      4.  Small amount of free fluid dependently within the " pelvis.                COURSE & MEDICAL DECISION MAKING  Pertinent Labs & Imaging studies reviewed. (See chart for details)    The patient has an allergy to contrast, I have ordered a noncontrast CT of her abdomen, differential diagnose includes ureteral colic, diverticulitis, constipation.  Labs have been ordered.  The patient would not like any pain or nausea medicine currently.    The patient's white blood count is elevated, her CT is consistent with sigmoid diverticulitis.    The patient is treated here with Cipro and Flagyl IV.  She is allergic to penicillin.    I will prescribe her Cipro and Flagyl and Bentyl.  She will follow-up with the ER if the symptoms worsen, she will follow-up with her doctor as needed.    The patient will return for new or worsening symptoms and is stable at the time of discharge.    The patient is referred to a primary physician for blood pressure management, diabetic screening, and for all other preventative health concerns.        DISPOSITION:  Patient will be discharged home in stable condition.    FOLLOW UP:  [facility], Emergency Dept  [address]  [facility] [address]  [phone]  Follow up  If symptoms worsen    Radha Guerrero M.D.  [address] [place] [address]  [facility] [address]  [phone]    Follow up  As needed    Nnamdi Blum M.D.  [address] [facility] [address]  [facility] [address]  [phone]            OUTPATIENT MEDICATIONS:  New Prescriptions    CIPROFLOXACIN (CIPRO) 500 MG TAB    Take 1 Tablet by mouth 2 times a day for 10 days.    DICYCLOMINE (BENTYL) 20 MG TAB    Take 1 Tablet by mouth every 6 hours.    METRONIDAZOLE (FLAGYL) 500 MG TAB    Take 1 Tablet by mouth 3 times a day for 10 days.     The patient will return for worsening symptoms and is stable at the time of discharge. The patient verbalizes understanding and will comply.    FINAL IMPRESSION  1. Diverticulitis  ciprofloxacin (CIPRO) 500 MG Tab    metroNIDAZOLE (FLAGYL) 500  MG Tab    dicyclomine (BENTYL) 20 MG Tab                 Electronically signed by: Dino Hayes M.D., 9/6/2022 9:22 AM

## 2022-09-06 NOTE — ED NOTES
Patient is stable for discharge at this time, anticipatory guidance provided, close follow-up is encouraged, and ED return instructions have been detailed. Patient and spouse are both agreeable to the disposition and plan and discharged home in ambulatory state and in good condition.     Rx education provided, Pt verbalized understanding;

## 2022-09-08 ENCOUNTER — TELEPHONE (OUTPATIENT)
Dept: SOCIAL WORK | Facility: CLINIC | Age: 67
End: 2022-09-08
Payer: MEDICARE

## 2022-09-12 NOTE — TELEPHONE ENCOUNTER
ED Follow-up  Call Attempts: 3  Date of ED visit: 09/06/22  Call Outcome: Reviewed ED visit with patient  Were you prescribed any medications?: Yes  Did you  your medications from the pharmacy?: Yes  Do you have any questions regarding your medications?: No  Do you have any questions about your discharge instructions?: No  RN Recommendations: Follow-up appointment scheduled  Total time spent (mins): 5

## 2022-09-20 ENCOUNTER — OFFICE VISIT (OUTPATIENT)
Dept: MEDICAL GROUP | Facility: PHYSICIAN GROUP | Age: 67
End: 2022-09-20
Payer: MEDICARE

## 2022-09-20 VITALS
TEMPERATURE: 98 F | RESPIRATION RATE: 15 BRPM | DIASTOLIC BLOOD PRESSURE: 70 MMHG | SYSTOLIC BLOOD PRESSURE: 110 MMHG | HEART RATE: 88 BPM | HEIGHT: 68 IN | BODY MASS INDEX: 22.11 KG/M2 | WEIGHT: 145.9 LBS | OXYGEN SATURATION: 98 %

## 2022-09-20 DIAGNOSIS — G44.049 CHRONIC PAROXYSMAL HEMICRANIA, NOT INTRACTABLE: ICD-10-CM

## 2022-09-20 DIAGNOSIS — Z13.820 SCREENING FOR OSTEOPOROSIS: ICD-10-CM

## 2022-09-20 DIAGNOSIS — K57.32 SIGMOID DIVERTICULITIS: ICD-10-CM

## 2022-09-20 PROCEDURE — 99214 OFFICE O/P EST MOD 30 MIN: CPT | Performed by: INTERNAL MEDICINE

## 2022-09-20 ASSESSMENT — FIBROSIS 4 INDEX: FIB4 SCORE: 1.23

## 2022-09-20 NOTE — PROGRESS NOTES
CC: Follow-up ER visit    HPI:  Yaz presents with the following    1. Sigmoid diverticulitis  Patient presented to the emergency room on September 6 with left lower quadrant pain for 24 hours.  Patient was diagnosed with sigmoid diverticulitis, discharged with Cipro, Flagyl and Bentyl.  Patient completed her antibiotics yesterday.  Feeling much better, able to tolerate food, no pain.  No GI symptoms.  Patient completed a colonoscopy about 10 years ago which was for screening colonoscopy.  No history of diverticulosis.  Recommended follow-up was 10 years.    2. Chronic paroxysmal hemicrania, not intractable  Patient has a complaints of chronic headache that started about 1 year ago.  Patient denies a history of migraines or seizures.  Patient completed brain MRI in 8/ 2021 which was negative.  Possible etiology from her cervical spine, therefore she completed 2 months of physical therapy.  Therapy did not resolve her complaints.  She describes the headache as frontal in between her eyes.  Headaches are most prominent upon waking up in the morning.  She believes she keeps well-hydrated.  No issues in the past with hypoglycemia.  Headaches resolve with over-the-counter pain relievers.  Patient denies any chest pain, shortness of breath, dizziness, palpitations or other focal neurologic symptoms.  Patient does snore at nighttime but states that she gets restful sleep.  She has never been evaluated by neurology.  Patient denies any sinus or allergy symptoms.  Patient saw her ophthalmologist and was diagnosed with possible macular degeneration.     4/5/2022:.  Mild to moderate headache still persists.  Patient believes it is secondary to receiving new glasses.  Patient has a follow-up appointment with ophthalmology on April 25.  CBC, CMP, lipid panel, vitamin D, thyroid function panel, ESR within good range.     607/22:.  Patient followed up with her ophthalmologist.  Per Dr. Cunningham there is no etiology/issues  causing her current symptoms of headache.  Patient plans to be evaluated by a new optometrist as her progressive glasses still may be the cause of her symptoms.    9/20/2022:.  Patient continues to have intermittent headaches and follow-up pain relief.  Patient followed up with her ophthalmologist and received a new prescription.  Not help with her symptoms.    3. Screening for osteoporosis  Patient completed a bone density with her gynecologist in 2019.  T score -0.9.  BMP within normal limits.  Vitamin D levels 50.      Patient Active Problem List    Diagnosis Date Noted    Sigmoid diverticulitis 09/20/2022    Prediabetes 06/07/2022    Essential hypertension 03/11/2022    Chronic paroxysmal hemicrania, not intractable 03/11/2022    H/O sarcoidosis 03/11/2022    Vitamin D deficiency 03/11/2022    Visual changes 03/11/2022       Current Outpatient Medications   Medication Sig Dispense Refill    ibuprofen (MOTRIN) 200 MG Tab Take 400 mg by mouth every day.      multivitamin (THERAGRAN) Tab Take 1 Tablet by mouth every day.      Omega-3 Fatty Acids (OMEGA 3 PO) Take 1 Capsule by mouth every day.      CALCIUM PO Take 2 Tablets by mouth every day.      dicyclomine (BENTYL) 20 MG Tab Take 1 Tablet by mouth every 6 hours. 20 Tablet 0    ondansetron (ZOFRAN ODT) 4 MG TABLET DISPERSIBLE Take 1 Tablet by mouth every 8 hours as needed for Nausea. 20 Tablet 0    losartan (COZAAR) 100 MG Tab TAKE 1 TABLET BY MOUTH EVERY  Tablet 0     No current facility-administered medications for this visit.         Allergies as of 09/20/2022 - Reviewed 09/20/2022   Allergen Reaction Noted    Iodine Hives 03/11/2022    Penicillins Hives 03/11/2022        Social History     Socioeconomic History    Marital status:      Spouse name: Not on file    Number of children: Not on file    Years of education: Not on file    Highest education level: Not on file   Occupational History    Not on file   Tobacco Use    Smoking status: Never     "Smokeless tobacco: Never   Vaping Use    Vaping Use: Never used   Substance and Sexual Activity    Alcohol use: Yes     Comment: OCC    Drug use: Never    Sexual activity: Not on file   Other Topics Concern    Not on file   Social History Narrative    Not on file     Social Determinants of Health     Financial Resource Strain: Not on file   Food Insecurity: Not on file   Transportation Needs: Not on file   Physical Activity: Not on file   Stress: Not on file   Social Connections: Not on file   Intimate Partner Violence: Not on file   Housing Stability: Not on file       History reviewed. No pertinent family history.    History reviewed. No pertinent surgical history.    ROS: Positive ROS per HPI denies any Headache,Chest pain,  Shortness of breath,  Abdominal pain, Changes of bowel or bladder, Lower ext edema, Fevers, Nights sweats, Weight Changes, Focal weakness or numbness.  All other systems are negative.    /70 (BP Location: Left arm, Patient Position: Sitting, BP Cuff Size: Small adult)   Pulse 88   Temp 36.7 °C (98 °F) (Temporal)   Resp 15   Ht 1.727 m (5' 8\")   Wt 66.2 kg (145 lb 14.4 oz)   SpO2 98%   BMI 22.18 kg/m²      Physical Exam:  Gen:         Alert and oriented, No apparent distress.  HEENT:   Perrla, TM clear,  Oralpharynx no erythema or exudates.  Neck:       No Jugular venous distension, Lymphadenopathy, Thyromegaly, Bruits.  Lungs:     Clear to auscultation bilaterally, no wheezing rhonchi or crackles.  CV:          Regular rate and rhythm. No murmurs, rubs or gallops.  Abd:         Soft non tender, non distended. Normal active bowel sounds.             Ext:          No clubbing, cyanosis, edema.      Assessment and Plan.   66 y.o. female presenting with the following.     1. Sigmoid diverticulitis  Recommend increasing fiber in diet.  Keeping well-hydrated.  Referral to GI.  - Referral to Gastroenterology    2. Chronic paroxysmal hemicrania, not intractable    - Referral to " Neurology    3. Screening for osteoporosis    - DS-BONE DENSITY STUDY (DEXA); Future    My total time spent caring for the patient on the day of the encounter was 30 minutes.   This does not include time spent on separately billable procedures/tests.

## 2022-11-16 ENCOUNTER — HOSPITAL ENCOUNTER (OUTPATIENT)
Dept: RADIOLOGY | Facility: MEDICAL CENTER | Age: 67
End: 2022-11-16
Attending: INTERNAL MEDICINE
Payer: MEDICARE

## 2022-11-16 DIAGNOSIS — Z13.820 SCREENING FOR OSTEOPOROSIS: ICD-10-CM

## 2022-11-16 PROCEDURE — 77080 DXA BONE DENSITY AXIAL: CPT

## 2022-11-21 ENCOUNTER — DOCUMENTATION (OUTPATIENT)
Dept: HEALTH INFORMATION MANAGEMENT | Facility: OTHER | Age: 67
End: 2022-11-21

## 2023-01-17 ENCOUNTER — APPOINTMENT (OUTPATIENT)
Dept: MEDICAL GROUP | Facility: PHYSICIAN GROUP | Age: 68
End: 2023-01-17
Payer: MEDICARE

## 2023-02-06 ENCOUNTER — OFFICE VISIT (OUTPATIENT)
Dept: NEUROLOGY | Facility: MEDICAL CENTER | Age: 68
End: 2023-02-06
Attending: PSYCHIATRY & NEUROLOGY
Payer: MEDICARE

## 2023-02-06 VITALS
WEIGHT: 151.68 LBS | SYSTOLIC BLOOD PRESSURE: 130 MMHG | HEIGHT: 68 IN | OXYGEN SATURATION: 98 % | RESPIRATION RATE: 14 BRPM | DIASTOLIC BLOOD PRESSURE: 78 MMHG | BODY MASS INDEX: 22.99 KG/M2 | HEART RATE: 68 BPM

## 2023-02-06 DIAGNOSIS — R51.9 HEADACHE, CHRONIC DAILY: Primary | ICD-10-CM

## 2023-02-06 DIAGNOSIS — R73.03 PREDIABETES: ICD-10-CM

## 2023-02-06 PROBLEM — K57.32 DIVERTICULITIS OF COLON: Status: ACTIVE | Noted: 2022-09-06

## 2023-02-06 PROCEDURE — 99211 OFF/OP EST MAY X REQ PHY/QHP: CPT | Performed by: PSYCHIATRY & NEUROLOGY

## 2023-02-06 PROCEDURE — 99205 OFFICE O/P NEW HI 60 MIN: CPT | Performed by: PSYCHIATRY & NEUROLOGY

## 2023-02-06 PROCEDURE — 99212 OFFICE O/P EST SF 10 MIN: CPT | Performed by: PSYCHIATRY & NEUROLOGY

## 2023-02-06 ASSESSMENT — PATIENT HEALTH QUESTIONNAIRE - PHQ9: CLINICAL INTERPRETATION OF PHQ2 SCORE: 0

## 2023-02-06 ASSESSMENT — FIBROSIS 4 INDEX: FIB4 SCORE: 1.244507934888323643

## 2023-02-06 NOTE — PROGRESS NOTES
Reason for Consult:  chronic (near daily) headaches for over  1 year.    History of present illness:    Yaz Naylor 67 y.o. who was born in [place] and grew up [place] (father was in the [employer]) and has lived in [place] since age 23. She went to [employer] (General Studies with Psychology Minor). She is an  ([employer]> making [employer]). She lives with her .      Reason for this referral relates to Kalie noticing a new type of  pain specifically starting and involving  the left inner eye socket area between the left bridge of the nose and eye ball.  The pain will almost seems like it's in the bone just abo e the left nasal bridge (slight pressure in between her eye brows seems to reduce the pain level).  Pressure to the above area does consistently seem to help but does not reduce the pain.      Headaches not clearly evoked or provoked by valsalva maneuvers,postural changes (bending), sex in the last 6-12 months.    The pain seems to eminate's from the focus of the left upper side (bridge of the nose and both eyes with the stabbing pain being localized and not radiating anywhere but bilateral aching remaining behind both eyes.    She has noticed for the last 1 year that this headache(s) has been a few days WITHOUT headache(s) - she even had 2 full days in a row without any headache(s) at all around time of her hospitalization.  She has had on average 1 to 2 headache free days per month in the year or so.    During a typical episode as above there has been  no visual disturbances - blurred vision, double vision or loss of vision    The longest that the above events have lasted has been 2 full days.      If she did not take Advil or Tylenol the pain would be consisently in the above area all day long. Rarely the pain has awoken her at night but not in the last 2-3 months.    There is no nausea,photophobia,sonophobia associated with this head.    Here eyes do not become teary,red nor  with nasal stuffiness or congestion during the episodes.    Historically she recalls the episodes about 2-3 times a week occurring in the 1st year they were occurring and never associated with specific or discrete food or environmental triggers nor any prior facial-head trauma events noted.    The pain can be a stabbing  or pulsating pain that can come in waves which can last 60 minutes or so (advil or tylenol taken PRN)- Advil seems to help more. Takes 1 to 2  Advil and the pain usually is gone within 30 minutes.     No sensory disturbances inside mouth, on face or on scalp to suggest neuralgia(s) nor any ongoing or evolving numbness/tingling or loss of feeling areas within mouth,on face or scalp in recent months.    No subjective memory/cognitive/speech- communication issues in the last 1-2 years.  Denies feeling or being depressed in the last 1-2  years.  Average sleep- 7-8 hours most nights in the recent 6 months; infrequent arousal to use bathroom.      Patient Active Problem List    Diagnosis Date Noted    Sigmoid diverticulitis 09/20/2022    Prediabetes 06/07/2022    Essential hypertension 03/11/2022    Chronic paroxysmal hemicrania, not intractable 03/11/2022    H/O sarcoidosis 03/11/2022    Vitamin D deficiency 03/11/2022    Visual changes 03/11/2022       Past medical history:   Past Medical History:   Diagnosis Date    Chronic paroxysmal hemicrania, not intractable 3/11/2022    Essential hypertension 3/11/2022    H/O sarcoidosis 3/11/2022    Prediabetes 6/7/2022    Sigmoid diverticulitis 9/20/2022    Visual changes 3/11/2022    Vitamin D deficiency 3/11/2022       Past surgical history:   No past surgical history on file.      Social history:   Social History     Socioeconomic History    Marital status:      Spouse name: Not on file    Number of children: Not on file    Years of education: Not on file    Highest education level: Not on file   Occupational History    Not on file   Tobacco Use     "Smoking status: Never    Smokeless tobacco: Never   Vaping Use    Vaping Use: Never used   Substance and Sexual Activity    Alcohol use: Yes     Comment: OCC    Drug use: Never    Sexual activity: Not on file   Other Topics Concern    Not on file   Social History Narrative    Not on file     Social Determinants of Health     Financial Resource Strain: Not on file   Food Insecurity: Not on file   Transportation Needs: Not on file   Physical Activity: Not on file   Stress: Not on file   Social Connections: Not on file   Intimate Partner Violence: Not on file   Housing Stability: Not on file       Family history:   No family history on file.      Current medications:   Current Outpatient Medications   Medication    losartan (COZAAR) 100 MG Tab    ibuprofen (MOTRIN) 200 MG Tab    multivitamin (THERAGRAN) Tab    Omega-3 Fatty Acids (OMEGA 3 PO)    CALCIUM PO    dicyclomine (BENTYL) 20 MG Tab    ondansetron (ZOFRAN ODT) 4 MG TABLET DISPERSIBLE     No current facility-administered medications for this visit.       Medication Allergy:  Allergies   Allergen Reactions    Iodine Hives    Penicillins Hives           Physical examination:   Vitals:    02/06/23 1054   BP: 130/78   BP Location: Right arm   Patient Position: Sitting   BP Cuff Size: Adult   Pulse: 68   Resp: 14   SpO2: 98%   Weight: 68.8 kg (151 lb 10.8 oz)   Height: 1.727 m (5' 8\")       Normal cephalic atraumatic.  Full Range of movement around the Neck in all directions without restrictions or discrete pain evoked triggers.  No lower extremity edema.      Neurological  Exam:    Mental status: Awake, alert and fully oriented to person, place, time, and situation. Normal attention, concentration, and fund of knowledge for education level.  Did not appear/act combative,irritable,anxious,paranoid/delusional or aggressive to or with me.  Speech and language: Speech is fluent without errors, clear, intact to repetition, and intact to naming.     Follows 3 step motor " commands in sequence without significant delay and correctly.    Cranial nerve exam:  II: Pupils are equally round and reactive to light. Visual fields are intact by confrontation.  III, IV, VI: EOMI, no diplopia, no ptosis.  Pupils 4>3 mm bilaterally.  V: Sensation to light touch is normal over V1-3 distributions bilaterally.  .  VII: Facial movements are symmetrical. There is no facial droop. .  VIII: Hearing intact to soft speech and finger rub bilaterally  IX: Palate elevates symmetrically, uvula is midline. Dysarthria is not present.  XI: Shoulder shrug are symmetrical and strong.   XII: Tongue protrudes midline.        Motor exam:  Muscle tone is normal in all 4 limbs.    Muscle strength:    Neck Flexors/Extensors: 5/5       Right  Left  Deltoid   5/5  5/5      Biceps   5/5  5/5  Triceps             5/5  5/5   Wrist extensors 5/5  5/5  Wrist flexors  5/5  5/5     5/5  5/5  Interossei  5/5  5/5  Thenar (APB)  5/5  5/5   Hip flexors  5/5  5/5  Quadriceps  5/5  5/5    Hamstrings  5/5  5/5  Dorsiflexors  5/5  5/5  Plantarflexors  5/5  5/5  Toe extension  5/5  5/5  Toe Flexors                5/5                   5/5          Reflexes:       Right  Left  Biceps   2/4  2/4  Triceps              2/4  2/4  Brachioradialis    2/4  2/4  Knee jerk  2/4  2/4  Ankle jerk  2/4  2/4     Frontal release signs are normal.    bilaterally toes are downgoing to plantar stimulation..    Coordination (finger-to-nose, heel/knee/shin, rapid alternating movements) was normal.     There was no truncal ataxia, no tremors, and no dysmetria.     Station and gait -easily stands up from exam chair without retropulsion,veering,leaning,swaying (to either side).   Arm swing symmetrical.    No Rombergism.      Labs and Tests:    Reviewed from 9/2022: normal except blood sugar of 126.    Component Ref Range & Units 10 mo ago    Glycohemoglobin 0.0 - 5.6 % 6.0 Abnormal     Internal Control Negative  Negative    Internal Control Positive   "Positive            NEUROIMAGING:       Brain MRI with IAC- 3/2022: with and without gado > no Acoustic Neuroma(s). No enhancement (abnormal)    Brain MRI without gado: [facility]> no abnormalities.      Impression/Plans/Recommendations:    Chronic (Daily) Headache- primarily with pain onset of stabbing quality just above and to the left of the upper left nasal bridge (characteristically) and then there is an aching (left= right retro orbital areas) with stabbing component going away and not returning the headache period which can be up to 1 day (rarely 2 days in a row as this happened 2-3 months during a \"prep\" for a colonoscopy).    There have been no features of encephalopathy of other Signs/Symptoms to suggest or support increased ICP issues in the last 6-12 months.    Yaz's headache features lack a true \"hemicrania\" component and do not meet criteria for hemicrania continua given lack of ptosis,nasal congestion/runny nose, red/teary eye.      The headache features lack migraine features given lack of GI irritation or \"sensitivities\" to light/sounds/smells around headache pain episodes.    The headache features and location of pain is not consistent with trigeminal neuralgia.    Of note the initial  features of onset were not consistent with New Onset Daily Persistent Headache either as the  headaches did not continue daily from the very beginning of it's onset.    Brain MRI x 2 (one with gado) done in last 2 years- unremarkable.    Gets consistent relief with 2 ES (Tylenol) or 2 Advil within 30 minutes and headache does not return.    There are no features of cranial neuropathies on exam today.    Plans:    A. Had an extensive review about risks/benefits of using Tylenol ES  which gives her lasting benefit for her headaches consistently (1 or 2 tablet) vs Motrin (400 mg a day)> main risk being GI upset/irritation/elevated BP and/or kidney issues.    B. We discussed some other considerations today such " as acupuncture or Accupressure given that self pressure to the area between her eyebrows seems to given her sustained relief (though not complete) with tactile finger(s) pressure to the above mentioned area.    ( I offered to place a referral for a trial of acupuncture for Yaz but she deferred on me doing that).    C. I do not feel that using Indocin is indicated and has more long term risk including effects on kidney function and GI ulcers/bleeding.    D.  I feel that ES Tylenol or even trying Regular Strength Tylenol (even if taking PRN daily at 1000 mg at a time) is reasonable rather than taking on additional risks with other empiric trials of potential head/face pain reduce medications such as Pamelor/Gabapentin.    E. At this time I do not feel that another Brain or Orbital MRI is needed nor would a CSF study make sense.    F. Attention to diet encouraged given prediabetic blood test  results last year.    I have performed  a history and physical exam and a directed /focused  ROS today.    Total time spent today or this patient's care was 62  minutes and included reviewing diagnostic workup to date (labs and imaging that include interpreting such tests relevant to this patient's neurological condition),  reviewing/obtaining separately obtained history from Kalie for today's neurological problem(s) and  giving advise and suggestions on the present neurological problem and  documenting the clinical information in the EMR.    Follow up at this point PRN.    Justin Pritchett MD  [facility].   [facility]

## 2023-03-02 ENCOUNTER — OFFICE VISIT (OUTPATIENT)
Dept: MEDICAL GROUP | Facility: PHYSICIAN GROUP | Age: 68
End: 2023-03-02
Payer: MEDICARE

## 2023-03-02 VITALS
OXYGEN SATURATION: 98 % | TEMPERATURE: 98.3 F | HEIGHT: 68 IN | DIASTOLIC BLOOD PRESSURE: 68 MMHG | HEART RATE: 76 BPM | BODY MASS INDEX: 23.01 KG/M2 | WEIGHT: 151.8 LBS | RESPIRATION RATE: 14 BRPM | SYSTOLIC BLOOD PRESSURE: 118 MMHG

## 2023-03-02 DIAGNOSIS — Z11.59 NEED FOR HEPATITIS C SCREENING TEST: ICD-10-CM

## 2023-03-02 DIAGNOSIS — R73.03 PREDIABETES: ICD-10-CM

## 2023-03-02 DIAGNOSIS — E55.9 VITAMIN D DEFICIENCY: ICD-10-CM

## 2023-03-02 DIAGNOSIS — R53.83 OTHER FATIGUE: ICD-10-CM

## 2023-03-02 DIAGNOSIS — Z13.220 SCREENING CHOLESTEROL LEVEL: ICD-10-CM

## 2023-03-02 DIAGNOSIS — M85.88 OSTEOPENIA OF LUMBAR SPINE: ICD-10-CM

## 2023-03-02 DIAGNOSIS — K57.32 DIVERTICULITIS OF COLON: ICD-10-CM

## 2023-03-02 DIAGNOSIS — G44.049 CHRONIC PAROXYSMAL HEMICRANIA, NOT INTRACTABLE: ICD-10-CM

## 2023-03-02 PROCEDURE — 99214 OFFICE O/P EST MOD 30 MIN: CPT | Performed by: INTERNAL MEDICINE

## 2023-03-02 ASSESSMENT — FIBROSIS 4 INDEX: FIB4 SCORE: 1.244507934888323643

## 2023-03-02 NOTE — PROGRESS NOTES
CC: Follow-up headaches    HPI:  Yaz presents with the following    1. Chronic paroxysmal hemicrania, not intractable  Patient with history of ongoing headaches over the last year her pain appears to be focused on her left upper side.  She will have days without any headaches possibly 2 in a row.  She describes an average of 1-2 headache free days per month.  Patient was seen by neurology and evaluated.  Symptoms lacked the features of encephalopathy to suggest increased ICP, lack of hemicrania component or migraine issues.  No further MRIs or lumbar puncture are indicated at this time.  Suggestions to continue Advil/Tylenol and possibly acupuncture were recommended.  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  She gets a massage monthly which has been helpful.    2. Prediabetes  Hemoglobin A1c 6.0 April 2022.  No PMH of diabetes.    3. Diverticulitis of colon  9/20/2022:Patient presented to the emergency room on September 6 with left lower quadrant pain for 24 hours.  Patient was diagnosed with sigmoid diverticulitis, discharged with Cipro, Flagyl and Bentyl.  Patient completed her antibiotics yesterday.  Feeling much better, able to tolerate food, no pain.  No GI symptoms.  Patient completed a colonoscopy about 10 years ago which was for screening colonoscopy.  No history of diverticulosis.  Recommended follow-up was 10 years.    3/2/2023:.  Patient completed her colonoscopy in November which showed mild diverticulosis, no polyps with a 10-year follow-up colonoscopy recommended.    4. Need for hepatitis C screening test  Due for hepatitis C screening.    5. Osteopenia of lumbar spine  Patient completed DEXA scan in November which showed a lumbar T score -1.1, femoral T score -0.4.  Patient takes calcium and vitamin D.  Vitamin D level last year was 50.          Patient Active Problem List    Diagnosis Date Noted    Osteopenia of lumbar spine  03/02/2023    Diverticulitis of colon 09/06/2022    Prediabetes 06/07/2022    Hypertension 03/11/2022    Chronic paroxysmal hemicrania, not intractable 03/11/2022    H/O sarcoidosis 03/11/2022    Vitamin D deficiency 03/11/2022    Visual changes 03/11/2022       Current Outpatient Medications   Medication Sig Dispense Refill    losartan (COZAAR) 100 MG Tab TAKE 1 TABLET BY MOUTH EVERY  Tablet 3    ibuprofen (MOTRIN) 200 MG Tab Take 400 mg by mouth every day.      multivitamin (THERAGRAN) Tab Take 1 Tablet by mouth every day.      Omega-3 Fatty Acids (OMEGA 3 PO) Take 1 Capsule by mouth every day.      CALCIUM PO Take 2 Tablets by mouth every day.       No current facility-administered medications for this visit.         Allergies as of 03/02/2023 - Reviewed 03/02/2023   Allergen Reaction Noted    Iodine Hives 03/11/2022    Other misc Hives 02/06/2023    Penicillins Hives 03/11/2022        Social History     Socioeconomic History    Marital status:      Spouse name: Not on file    Number of children: Not on file    Years of education: Not on file    Highest education level: Not on file   Occupational History    Not on file   Tobacco Use    Smoking status: Never    Smokeless tobacco: Never   Vaping Use    Vaping Use: Never used   Substance and Sexual Activity    Alcohol use: Yes     Comment: OCC    Drug use: Never    Sexual activity: Not on file   Other Topics Concern    Not on file   Social History Narrative    Not on file     Social Determinants of Health     Financial Resource Strain: Not on file   Food Insecurity: Not on file   Transportation Needs: Not on file   Physical Activity: Not on file   Stress: Not on file   Social Connections: Not on file   Intimate Partner Violence: Not on file   Housing Stability: Not on file       History reviewed. No pertinent family history.    History reviewed. No pertinent surgical history.    ROS: Positive ROS per HPI.  Denies any Headache,Chest pain,  Shortness of  "breath,  Abdominal pain, Changes of bowel or bladder, Lower ext edema, Fevers, Nights sweats, Weight Changes, Focal weakness or numbness.  All other systems are negative.    /68 (BP Location: Right arm, Patient Position: Sitting, BP Cuff Size: Adult)   Pulse 76   Temp 36.8 °C (98.3 °F) (Temporal)   Resp 14   Ht 1.727 m (5' 8\")   Wt 68.9 kg (151 lb 12.8 oz)   SpO2 98%   BMI 23.08 kg/m²      Constitutional: Alert, no distress, well-groomed.  Skin: Warm, dry, good turgor, no rashes in visible areas.  Eye: Equal, round and reactive, conjunctiva clear, lids normal.  ENMT: Lips without lesions, good dentition, moist mucous membranes.  Neck: Trachea midline, no masses, no thyromegaly.  Respiratory: Unlabored respiratory effort, no cough.  Abdomen: Soft, no gross masses.  MSK: Normal gait, moves all extremities.  Neuro: Grossly non-focal. No cranial nerve deficit. Strength and sensation intact.   Psych: Alert and oriented x3, normal affect and mood.      Assessment and Plan.   67 y.o. female presenting with the following.     1. Chronic paroxysmal hemicrania, not intractable  Continue to use Advil/Tylenol as needed.  Counseled patient on stress management, benefits of regular exercise, sleep hygiene.    - Referral for Acupuncture    2. Prediabetes    - HGB A1C WITH EAG ESTIMATION  - Comp Metabolic Panel; Future    3. Diverticulitis of colon  Stable.  Up-to-date with colonoscopy.  Increase fiber in diet.    4. Need for hepatitis C screening test  Order hepatitis C screening    5. Osteopenia of lumbar spine  Continue with calcium and vitamin D supplementation with the addition of K2.    Weightbearing exercise.    6. Screening cholesterol level    - CBC WITH DIFFERENTIAL; Future  - Lipid Profile; Future    7. Vitamin D deficiency    - VITAMIN D,25 HYDROXY (DEFICIENCY); Future    8. Other fatigue    - TSH; Future    My total time spent caring for the patient on the day of the encounter was 32 minutes.   This does " not include time spent on separately billable procedures/tests.

## 2023-04-14 ENCOUNTER — HOSPITAL ENCOUNTER (OUTPATIENT)
Dept: LAB | Facility: MEDICAL CENTER | Age: 68
End: 2023-04-14
Attending: INTERNAL MEDICINE
Payer: MEDICARE

## 2023-04-14 DIAGNOSIS — R53.83 OTHER FATIGUE: ICD-10-CM

## 2023-04-14 DIAGNOSIS — Z11.59 NEED FOR HEPATITIS C SCREENING TEST: ICD-10-CM

## 2023-04-14 DIAGNOSIS — Z13.220 SCREENING CHOLESTEROL LEVEL: ICD-10-CM

## 2023-04-14 DIAGNOSIS — E55.9 VITAMIN D DEFICIENCY: ICD-10-CM

## 2023-04-14 DIAGNOSIS — R73.03 PREDIABETES: ICD-10-CM

## 2023-04-14 LAB
BASOPHILS # BLD AUTO: 1 % (ref 0–1.8)
BASOPHILS # BLD: 0.05 K/UL (ref 0–0.12)
EOSINOPHIL # BLD AUTO: 0.24 K/UL (ref 0–0.51)
EOSINOPHIL NFR BLD: 4.8 % (ref 0–6.9)
ERYTHROCYTE [DISTWIDTH] IN BLOOD BY AUTOMATED COUNT: 44 FL (ref 35.9–50)
EST. AVERAGE GLUCOSE BLD GHB EST-MCNC: 140 MG/DL
HBA1C MFR BLD: 6.5 % (ref 4–5.6)
HCT VFR BLD AUTO: 46.5 % (ref 37–47)
HGB BLD-MCNC: 14.8 G/DL (ref 12–16)
IMM GRANULOCYTES # BLD AUTO: 0.02 K/UL (ref 0–0.11)
IMM GRANULOCYTES NFR BLD AUTO: 0.4 % (ref 0–0.9)
LYMPHOCYTES # BLD AUTO: 1.69 K/UL (ref 1–4.8)
LYMPHOCYTES NFR BLD: 33.5 % (ref 22–41)
MCH RBC QN AUTO: 30 PG (ref 27–33)
MCHC RBC AUTO-ENTMCNC: 31.8 G/DL (ref 33.6–35)
MCV RBC AUTO: 94.1 FL (ref 81.4–97.8)
MONOCYTES # BLD AUTO: 0.43 K/UL (ref 0–0.85)
MONOCYTES NFR BLD AUTO: 8.5 % (ref 0–13.4)
NEUTROPHILS # BLD AUTO: 2.61 K/UL (ref 2–7.15)
NEUTROPHILS NFR BLD: 51.8 % (ref 44–72)
NRBC # BLD AUTO: 0 K/UL
NRBC BLD-RTO: 0 /100 WBC
PLATELET # BLD AUTO: 202 K/UL (ref 164–446)
PMV BLD AUTO: 10.3 FL (ref 9–12.9)
RBC # BLD AUTO: 4.94 M/UL (ref 4.2–5.4)
WBC # BLD AUTO: 5 K/UL (ref 4.8–10.8)

## 2023-04-14 PROCEDURE — 84443 ASSAY THYROID STIM HORMONE: CPT

## 2023-04-14 PROCEDURE — 80053 COMPREHEN METABOLIC PANEL: CPT

## 2023-04-14 PROCEDURE — 86803 HEPATITIS C AB TEST: CPT

## 2023-04-14 PROCEDURE — 36415 COLL VENOUS BLD VENIPUNCTURE: CPT

## 2023-04-14 PROCEDURE — 85025 COMPLETE CBC W/AUTO DIFF WBC: CPT

## 2023-04-14 PROCEDURE — 83036 HEMOGLOBIN GLYCOSYLATED A1C: CPT

## 2023-04-14 PROCEDURE — 80061 LIPID PANEL: CPT

## 2023-04-14 PROCEDURE — 82306 VITAMIN D 25 HYDROXY: CPT

## 2023-04-15 LAB
25(OH)D3 SERPL-MCNC: 51 NG/ML (ref 30–100)
ALBUMIN SERPL BCP-MCNC: 4.5 G/DL (ref 3.2–4.9)
ALBUMIN/GLOB SERPL: 1.4 G/DL
ALP SERPL-CCNC: 69 U/L (ref 30–99)
ALT SERPL-CCNC: 27 U/L (ref 2–50)
ANION GAP SERPL CALC-SCNC: 14 MMOL/L (ref 7–16)
AST SERPL-CCNC: 27 U/L (ref 12–45)
BILIRUB SERPL-MCNC: 0.5 MG/DL (ref 0.1–1.5)
BUN SERPL-MCNC: 21 MG/DL (ref 8–22)
CALCIUM ALBUM COR SERPL-MCNC: 9.4 MG/DL (ref 8.5–10.5)
CALCIUM SERPL-MCNC: 9.8 MG/DL (ref 8.5–10.5)
CHLORIDE SERPL-SCNC: 101 MMOL/L (ref 96–112)
CHOLEST SERPL-MCNC: 227 MG/DL (ref 100–199)
CO2 SERPL-SCNC: 24 MMOL/L (ref 20–33)
CREAT SERPL-MCNC: 0.84 MG/DL (ref 0.5–1.4)
FASTING STATUS PATIENT QL REPORTED: NORMAL
GFR SERPLBLD CREATININE-BSD FMLA CKD-EPI: 76 ML/MIN/1.73 M 2
GLOBULIN SER CALC-MCNC: 3.3 G/DL (ref 1.9–3.5)
GLUCOSE SERPL-MCNC: 110 MG/DL (ref 65–99)
HCV AB SER QL: NORMAL
HDLC SERPL-MCNC: 68 MG/DL
LDLC SERPL CALC-MCNC: 130 MG/DL
POTASSIUM SERPL-SCNC: 4.7 MMOL/L (ref 3.6–5.5)
PROT SERPL-MCNC: 7.8 G/DL (ref 6–8.2)
SODIUM SERPL-SCNC: 139 MMOL/L (ref 135–145)
TRIGL SERPL-MCNC: 144 MG/DL (ref 0–149)
TSH SERPL DL<=0.005 MIU/L-ACNC: 0.89 UIU/ML (ref 0.38–5.33)

## 2023-06-08 ENCOUNTER — OFFICE VISIT (OUTPATIENT)
Dept: MEDICAL GROUP | Facility: PHYSICIAN GROUP | Age: 68
End: 2023-06-08
Payer: MEDICARE

## 2023-06-08 VITALS
DIASTOLIC BLOOD PRESSURE: 70 MMHG | OXYGEN SATURATION: 97 % | WEIGHT: 149 LBS | BODY MASS INDEX: 22.58 KG/M2 | HEIGHT: 68 IN | HEART RATE: 77 BPM | TEMPERATURE: 98.8 F | SYSTOLIC BLOOD PRESSURE: 118 MMHG

## 2023-06-08 DIAGNOSIS — Z23 NEED FOR PNEUMOCOCCAL VACCINATION: ICD-10-CM

## 2023-06-08 DIAGNOSIS — Z00.00 ENCOUNTER FOR SUBSEQUENT ANNUAL WELLNESS VISIT (AWV) IN MEDICARE PATIENT: ICD-10-CM

## 2023-06-08 DIAGNOSIS — Z23 NEED FOR VACCINATION: ICD-10-CM

## 2023-06-08 DIAGNOSIS — R73.03 PREDIABETES: ICD-10-CM

## 2023-06-08 DIAGNOSIS — Z86.2 H/O SARCOIDOSIS: ICD-10-CM

## 2023-06-08 DIAGNOSIS — K57.32 DIVERTICULITIS OF COLON: ICD-10-CM

## 2023-06-08 DIAGNOSIS — E55.9 VITAMIN D DEFICIENCY: ICD-10-CM

## 2023-06-08 DIAGNOSIS — E78.00 ELEVATED CHOLESTEROL: ICD-10-CM

## 2023-06-08 DIAGNOSIS — G44.049 CHRONIC PAROXYSMAL HEMICRANIA, NOT INTRACTABLE: ICD-10-CM

## 2023-06-08 DIAGNOSIS — I10 HYPERTENSION, UNSPECIFIED TYPE: ICD-10-CM

## 2023-06-08 DIAGNOSIS — H91.90 DECREASED HEARING, UNSPECIFIED LATERALITY: ICD-10-CM

## 2023-06-08 DIAGNOSIS — M85.88 OSTEOPENIA OF LUMBAR SPINE: ICD-10-CM

## 2023-06-08 PROCEDURE — 3074F SYST BP LT 130 MM HG: CPT | Performed by: INTERNAL MEDICINE

## 2023-06-08 PROCEDURE — G0009 ADMIN PNEUMOCOCCAL VACCINE: HCPCS | Performed by: INTERNAL MEDICINE

## 2023-06-08 PROCEDURE — 3078F DIAST BP <80 MM HG: CPT | Performed by: INTERNAL MEDICINE

## 2023-06-08 PROCEDURE — G0439 PPPS, SUBSEQ VISIT: HCPCS | Mod: 25 | Performed by: INTERNAL MEDICINE

## 2023-06-08 PROCEDURE — 90677 PCV20 VACCINE IM: CPT | Performed by: INTERNAL MEDICINE

## 2023-06-08 ASSESSMENT — FIBROSIS 4 INDEX: FIB4 SCORE: 1.7

## 2023-06-08 ASSESSMENT — ACTIVITIES OF DAILY LIVING (ADL): BATHING_REQUIRES_ASSISTANCE: 0

## 2023-06-08 ASSESSMENT — PATIENT HEALTH QUESTIONNAIRE - PHQ9: CLINICAL INTERPRETATION OF PHQ2 SCORE: 0

## 2023-06-08 ASSESSMENT — ENCOUNTER SYMPTOMS: GENERAL WELL-BEING: EXCELLENT

## 2023-06-08 NOTE — PROGRESS NOTES
Chief Complaint   Patient presents with    Annual Wellness Visit    Lab Results       HPI:  Yaz Naylor is a 67 y.o. here for Medicare Annual Wellness Visit     1. Encounter for subsequent annual wellness visit (AWV) in Medicare patient  Annual wellness visit.  - Subsequent Annual Wellness Visit - Includes PPPS ()    2. Chronic paroxysmal hemicrania, not intractable  Stable.  Not an issue at this time.  - Subsequent Annual Wellness Visit - Includes PPPS ()    3. Diverticulitis of colon  Patient presented to the emergency room on September 6, 2022 with left lower quadrant pain for 24 hours.  Patient was diagnosed with sigmoid diverticulitis, discharged with Cipro, Flagyl and Bentyl.  Patient completed her antibiotics yesterday.  Feeling much better, able to tolerate food, no pain.  No GI symptoms.  Patient completed a colonoscopy about 10 years ago which was for screening colonoscopy.  No history of diverticulosis.  Recommended follow-up was 10 years.    - Subsequent Annual Wellness Visit - Includes PPPS ()    4. H/O sarcoidosis  Stable.  Monitoring.  - Subsequent Annual Wellness Visit - Includes PPPS ()    5. Hypertension, unspecified type  Blood pressure stable with Cozaar 100 mg tablets daily.  - Subsequent Annual Wellness Visit - Includes PPPS ()    6. Osteopenia of lumbar spine  Stable.  Up-to-date with bone density which she completed November 2022.  Takes calcium and vitamin D.  - Subsequent Annual Wellness Visit - Includes PPPS ()    7. Prediabetes  Hemoglobin A1c 6.5, up from 6.0 last year.  Positive family history of diabetes in her mom.  No personal PMH of diabetes.  After seeing her lab results, discontinued ice cream.  Patient is walking at least 20 to 30 minutes a day.  No diabetic medications at this time.  - HEMOGLOBIN A1C; Future  - Subsequent Annual Wellness Visit - Includes PPPS ()    8. Vitamin D deficiency  Due for vitamin D level.  - Subsequent Annual  [facility]    9. Need for vaccination  Patient received Prevnar 23 in March 2022, due for second Pneumovax.  - Subsequent Annual Wellness Visit - Includes PPPS ()    10. Elevated cholesterol  Patient taking fish oil.  Total cholesterol 227, up from 195.  .  Positive coronary artery disease in her grandfather and her mom in her 80s.  - Lipid Profile; Future  - Subsequent Annual Wellness Visit - Includes PPPS ()    11. Need for pneumococcal vaccination    - Pneumococcal Conjugate Vaccine 20-Valent (19 yrs+)    12: Decreased hearing  Patient has noticed decreased hearing left greater than right.  No hearing test completed.    Patient Active Problem List    Diagnosis Date Noted    Osteopenia of lumbar spine 03/02/2023    Diverticulitis of colon 09/06/2022    Prediabetes 06/07/2022    Hypertension 03/11/2022    Chronic paroxysmal hemicrania, not intractable 03/11/2022    H/O sarcoidosis 03/11/2022    Vitamin D deficiency 03/11/2022    Visual changes 03/11/2022       Current Outpatient Medications   Medication Sig Dispense Refill    MAGNESIUM PO       losartan (COZAAR) 100 MG Tab TAKE 1 TABLET BY MOUTH EVERY  Tablet 3    ibuprofen (MOTRIN) 200 MG Tab Take 400 mg by mouth every day.      multivitamin (THERAGRAN) Tab Take 1 Tablet by mouth every day.      Omega-3 Fatty Acids (OMEGA 3 PO) Take 1 Capsule by mouth every day.      CALCIUM PO Take 2 Tablets by mouth every day.       No current facility-administered medications for this visit.          Current supplements as per medication list.     Allergies: Iodine, Other misc, and Penicillins    Current social contact/activities:      She  reports that she has never smoked. She has never used smokeless tobacco. She reports current alcohol use. She reports that she does not use drugs.  Counseling given: Not Answered      ROS:    Gait: Uses no assistive device  Ostomy: No  Other tubes: No  Amputations: No  Chronic oxygen use:  No  Last eye exam: January 2023   Wears hearing aids: No   : Denies any urinary leakage during the last 6 months    Screening:  Patient is up-to-date with mammogram, DEXA scan and colonoscopy.  Per GYN, no follow-up Pap smears needed.    Depression Screening  Little interest or pleasure in doing things?  0 - not at all  Feeling down, depressed , or hopeless? 0 - not at all  Trouble falling or staying asleep, or sleeping too much?     Feeling tired or having little energy?     Poor appetite or overeating?     Feeling bad about yourself - or that you are a failure or have let yourself or your family down?    Trouble concentrating on things, such as reading the newspaper or watching television?    Moving or speaking so slowly that other people could have noticed.  Or the opposite - being so fidgety or restless that you have been moving around a lot more than usual?     Thoughts that you would be better off dead, or of hurting yourself?     Patient Health Questionnaire Score:      If depressive symptoms identified deferred to follow up visit unless specifically addressed in assessment and plan.    Interpretation of PHQ-9 Total Score   Score Severity   1-4 No Depression   5-9 Mild Depression   10-14 Moderate Depression   15-19 Moderately Severe Depression   20-27 Severe Depression    Screening for Cognitive Impairment  Three Minute Recall (Banana, Sunrise, Chair) 3/3    Lalito clock face with all 12 numbers and set the hands to show 20 past 8.  No    Cognitive concerns identified deferred for follow up unless specifically addressed in assessment and plan.    Fall Risk Assessment  Has the patient had two or more falls in the last year or any fall with injury in the last year?  Yes    Safety Assessment  Throw rugs on floor.  Yes  Handrails on all stairs.  No  Good lighting in all hallways.  Yes  Difficulty hearing.  Yes  Patient counseled about all safety risks that were identified.    Functional Assessment ADLs  Are there  any barriers preventing you from cooking for yourself or meeting nutritional needs?  No.    Are there any barriers preventing you from driving safely or obtaining transportation?  No.    Are there any barriers preventing you from using a telephone or calling for help?  No    Are there any barriers preventing you from shopping?  No.    Are there any barriers preventing you from taking care of your own finances?  No    Are there any barriers preventing you from managing your medications?  No    Are there any barriers preventing you from showering, bathing or dressing yourself? No    Are you currently engaging in any exercise or physical activity?  Yes.    What is your perception of your health? Excellent    Advance Care Planning  Do you have an Advance Directive, Living Will, Durable Power of , or POLST? No                 Health Maintenance Summary            Overdue - Annual Wellness Visit (Every 366 Days) Overdue - never done      No completion history exists for this topic.              Overdue - COVID-19 Vaccine (3 - Moderna series) Overdue since 1/17/2023 09/17/2022  Imm Admin: PFIZER BIVALENT BOOSTER SARS-COV-2 VACCINE (12+)    05/04/2022  Imm Admin: MODERNA SARS-COV-2 VACCINE (12+)              Overdue - IMM PNEUMOCOCCAL VACCINE: 65+ Years (2 - PCV) Overdue since 3/11/2023      03/11/2022  Imm Admin: Pneumococcal polysaccharide vaccine (PPSV-23)              MAMMOGRAM (Every 2 Years) Next due on 7/27/2024 07/27/2022  MA-SCREENING MAMMO BILAT W/TOMOSYNTHESIS W/CAD    06/23/2021  MA-SCREENING MAMMO BILAT W/TOMOSYNTHESIS W/CAD    06/22/2020  MA-SCREENING MAMMO BILAT W/TOMOSYNTHESIS W/CAD    04/30/2019  MA-SCREENING MAMMO BILAT W/TOMOSYNTHESIS W/CAD    04/05/2018  MA-SCREENING MAMMO BILAT W/TOMOSYNTHESIS W/CAD    Only the first 5 history entries have been loaded, but more history exists.              IMM DTaP/Tdap/Td Vaccine (2 - Td or Tdap) Next due on 8/19/2026 08/19/2016  Imm Admin: Tdap  Vaccine              BONE DENSITY (Every 5 Years) Next due on 11/16/2027 11/16/2022  DS-BONE DENSITY STUDY (DEXA)    09/25/2014  DS-BONE DENSITY STUDY (DEXA)              COLORECTAL CANCER SCREENING (COLONOSCOPY - Every 10 Years) Tentatively due on 1/6/2033 01/06/2023  AMB EXTERNAL COLONOSCOPY RESULTS              IMM ZOSTER VACCINES (Series Information) Completed      01/09/2022  Imm Admin: Zoster Vaccine Recombinant (RZV) (SHINGRIX)    09/25/2021  Imm Admin: Zoster Vaccine Recombinant (RZV) (SHINGRIX)    06/08/2013  Imm Admin: Zoster Vaccine Live (ZVL) (Zostavax) - HISTORICAL DATA              IMM INFLUENZA (Series Information) Completed      09/17/2022  Imm Admin: Influenza Vaccine Adult HD    09/01/2022  Imm Admin: Influenza Seasonal Injectable - Historical Data    10/06/2021  Imm Admin: Influenza Vaccine Quad Inj (Pf)    09/25/2020  Imm Admin: Influenza, Unspecified - HISTORICAL DATA    10/07/2019  Imm Admin: Influenza Vaccine Quad Inj (Pf)    Only the first 5 history entries have been loaded, but more history exists.              HEPATITIS C SCREENING  Completed      04/14/2023  HEP C VIRUS ANTIBODY              IMM HEP B VACCINE (Series Information) Aged Out      No completion history exists for this topic.              HPV Vaccines (Series Information) Aged Out      No completion history exists for this topic.              IMM MENINGOCOCCAL ACWY VACCINE (Series Information) Aged Out      No completion history exists for this topic.                    Patient Care Team:  Radha Guerrero M.D. as PCP - General (Internal Medicine)  Nnamdi Blum M.D. as PCP - [facility]      Social History     Tobacco Use    Smoking status: Never    Smokeless tobacco: Never   Vaping Use    Vaping Use: Never used   Substance Use Topics    Alcohol use: Yes     Comment: OCC    Drug use: Never     No family history on file.  She  has a past medical history of Chronic paroxysmal hemicrania, not intractable (3/11/2022),  "Essential hypertension (3/11/2022), H/O sarcoidosis (3/11/2022), Osteopenia of lumbar spine (3/2/2023), Prediabetes (6/7/2022), Sigmoid diverticulitis (9/20/2022), Visual changes (3/11/2022), and Vitamin D deficiency (3/11/2022).   No past surgical history on file.    Exam:   /70   Pulse 77   Temp 37.1 °C (98.8 °F) (Temporal)   Ht 1.727 m (5' 8\")   Wt 67.6 kg (149 lb)   SpO2 97%  Body mass index is 22.66 kg/m².    Hearing good.    Dentition fair  Alert, oriented in no acute distress.  Eye contact is good, speech goal directed, affect calm      Constitutional: Alert, no distress.  Skin: Warm, dry, good turgor, no rashes in visible areas.  Eye: Equal, round and reactive, conjunctiva clear, lids normal.  ENMT: Lips without lesions, good dentition, oropharynx clear.  Neck: Trachea midline, no masses, no thyromegaly. No cervical or supraclavicular lymphadenopathy  Respiratory: Unlabored respiratory effort, lungs clear to auscultation, no wheezes, no ronchi.  Cardiovascular: Normal S1, S2, no murmur, no edema.  Abdomen: Soft, non-tender, no masses, no hepatosplenomegaly.  Psych: Alert and oriented x3, normal affect and mood.    Assessment and Plan. The following treatment and monitoring plan is recommended:    There are no diagnoses linked to this encounter.      1. Encounter for subsequent annual wellness visit (AWV) in Medicare patient  Annual wellness visit.  - Subsequent Annual Wellness Visit - Includes PPPS ()    2. Chronic paroxysmal hemicrania, not intractable  Stable  - Subsequent Annual Wellness Visit - Includes PPPS ()    3. Diverticulitis of colon  Continue to monitor.  - Subsequent Annual Wellness Visit - Includes PPPS ()    4. H/O sarcoidosis  Stable  - Subsequent Annual Wellness Visit - Includes PPPS ()    5. Hypertension, unspecified type  Stable.  Continue Cozaar at current dose.  - Subsequent Annual Wellness Visit - Includes PPPS ()    6. Osteopenia of lumbar " spine  Continue weightbearing exercise, calcium and vitamin D supplementation.  - Subsequent Annual Wellness Visit - Includes PPPS ()    7. Prediabetes    - HEMOGLOBIN A1C; Future  - Subsequent Annual Wellness Visit - Includes PPPS ()    8. Vitamin D deficiency    - Subsequent Annual Wellness Visit - Includes PPPS ()    9. Need for vaccination    - Subsequent Annual Wellness Visit - Includes PPPS ()    10. Elevated cholesterol    - Lipid Profile; Future  - Subsequent Annual Wellness Visit - Includes PPPS ()    11. Need for pneumococcal vaccination    - Pneumococcal Conjugate Vaccine 20-Valent (19 yrs+)    12: Decreased hearing  Referral to audiology    Services suggested: No services needed at this time  Health Care Screening: Age-appropriate preventive services recommended by USPTF and ACIP covered by Medicare were discussed today. Services ordered if indicated and agreed upon by the patient.  Referrals offered: Community-based lifestyle interventions to reduce health risks and promote self-management and wellness, fall prevention, nutrition, physical activity, tobacco-use cessation, weight loss, and mental health services as per orders if indicated.    Discussion today about general wellness and lifestyle habits:    Prevent falls and reduce trip hazards; Cautioned about securing or removing rugs.  Have a working fire alarm and carbon monoxide detector;   Engage in regular physical activity and social activities     Follow-up: No follow-ups on file.

## 2023-06-30 ENCOUNTER — DOCUMENTATION (OUTPATIENT)
Dept: HEALTH INFORMATION MANAGEMENT | Facility: OTHER | Age: 68
End: 2023-06-30
Payer: MEDICARE

## 2023-07-18 ENCOUNTER — TELEPHONE (OUTPATIENT)
Dept: HEALTH INFORMATION MANAGEMENT | Facility: OTHER | Age: 68
End: 2023-07-18

## 2023-07-19 ENCOUNTER — APPOINTMENT (RX ONLY)
Dept: URBAN - METROPOLITAN AREA CLINIC 4 | Facility: CLINIC | Age: 68
Setting detail: DERMATOLOGY
End: 2023-07-19

## 2023-07-19 DIAGNOSIS — L57.0 ACTINIC KERATOSIS: ICD-10-CM

## 2023-07-19 DIAGNOSIS — Z85.828 PERSONAL HISTORY OF OTHER MALIGNANT NEOPLASM OF SKIN: ICD-10-CM

## 2023-07-19 DIAGNOSIS — L81.4 OTHER MELANIN HYPERPIGMENTATION: ICD-10-CM

## 2023-07-19 DIAGNOSIS — Z71.89 OTHER SPECIFIED COUNSELING: ICD-10-CM

## 2023-07-19 DIAGNOSIS — L82.1 OTHER SEBORRHEIC KERATOSIS: ICD-10-CM

## 2023-07-19 DIAGNOSIS — D22 MELANOCYTIC NEVI: ICD-10-CM

## 2023-07-19 DIAGNOSIS — D18.0 HEMANGIOMA: ICD-10-CM

## 2023-07-19 PROBLEM — D22.5 MELANOCYTIC NEVI OF TRUNK: Status: ACTIVE | Noted: 2023-07-19

## 2023-07-19 PROBLEM — D18.01 HEMANGIOMA OF SKIN AND SUBCUTANEOUS TISSUE: Status: ACTIVE | Noted: 2023-07-19

## 2023-07-19 PROBLEM — D48.5 NEOPLASM OF UNCERTAIN BEHAVIOR OF SKIN: Status: ACTIVE | Noted: 2023-07-19

## 2023-07-19 PROCEDURE — 17000 DESTRUCT PREMALG LESION: CPT | Mod: 59

## 2023-07-19 PROCEDURE — ? SUNSCREEN RECOMMENDATIONS

## 2023-07-19 PROCEDURE — ? BIOPSY BY SHAVE METHOD

## 2023-07-19 PROCEDURE — ? COUNSELING

## 2023-07-19 PROCEDURE — 11102 TANGNTL BX SKIN SINGLE LES: CPT

## 2023-07-19 PROCEDURE — ? LIQUID NITROGEN

## 2023-07-19 PROCEDURE — 17003 DESTRUCT PREMALG LES 2-14: CPT

## 2023-07-19 PROCEDURE — 99213 OFFICE O/P EST LOW 20 MIN: CPT | Mod: 25

## 2023-07-19 ASSESSMENT — LOCATION SIMPLE DESCRIPTION DERM
LOCATION SIMPLE: CHEST
LOCATION SIMPLE: UPPER BACK
LOCATION SIMPLE: LEFT FOREARM
LOCATION SIMPLE: RIGHT UPPER BACK
LOCATION SIMPLE: ABDOMEN
LOCATION SIMPLE: RIGHT FOREARM
LOCATION SIMPLE: RIGHT EYEBROW

## 2023-07-19 ASSESSMENT — LOCATION DETAILED DESCRIPTION DERM
LOCATION DETAILED: RIGHT INFERIOR UPPER BACK
LOCATION DETAILED: RIGHT CENTRAL EYEBROW
LOCATION DETAILED: LEFT MEDIAL SUPERIOR CHEST
LOCATION DETAILED: RIGHT PROXIMAL DORSAL FOREARM
LOCATION DETAILED: INFERIOR THORACIC SPINE
LOCATION DETAILED: MIDDLE STERNUM
LOCATION DETAILED: EPIGASTRIC SKIN
LOCATION DETAILED: LEFT PROXIMAL DORSAL FOREARM

## 2023-07-19 ASSESSMENT — LOCATION ZONE DERM
LOCATION ZONE: TRUNK
LOCATION ZONE: ARM
LOCATION ZONE: FACE

## 2023-07-19 NOTE — HPI: SKIN LESION
Is This A New Presentation, Or A Follow-Up?: Follow Up Skin Lesion
What Type Of Note Output Would You Prefer (Optional)?: Standard Output
How Severe Is Your Skin Lesion?: mild
Has Your Skin Lesion Been Treated?: been treated
When Was It Treated?: 09/22/2021
Which Family Member (Optional)?: Sister

## 2023-07-19 NOTE — PROCEDURE: LIQUID NITROGEN
Render Note In Bullet Format When Appropriate: No
Number Of Freeze-Thaw Cycles: 2 freeze-thaw cycles
Post-Care Instructions: I reviewed with the patient in detail post-care instructions. Patient is to wear sunprotection, and avoid picking at any of the treated lesions. Pt may apply Vaseline to crusted or scabbing areas.
Consent: The patient's consent was obtained including but not limited to risks of crusting, scabbing, blistering, scarring, darker or lighter pigmentary change, recurrence, incomplete removal and infection.
Detail Level: Simple
Show Aperture Variable?: Yes
Duration Of Freeze Thaw-Cycle (Seconds): 2

## 2023-07-19 NOTE — HPI: FULL BODY SKIN EXAMINATION
How Severe Are Your Spot(S)?: mild
What Type Of Note Output Would You Prefer (Optional)?: Standard Output
What Is The Reason For Today's Visit?: Full Body Skin Examination
What Is The Reason For Today's Visit? (Being Monitored For X): concerning skin lesions on an annual basis
Additional History: Pt has not noticed any new or changing moles.

## 2023-07-25 ENCOUNTER — RX ONLY (OUTPATIENT)
Age: 68
Setting detail: RX ONLY
End: 2023-07-25

## 2023-07-25 RX ORDER — FLUOROURACIL 5 MG/G
CREAM TOPICAL
Qty: 40 | Refills: 0 | Status: ERX | COMMUNITY
Start: 2023-07-25

## 2023-10-03 ENCOUNTER — HOSPITAL ENCOUNTER (OUTPATIENT)
Dept: LAB | Facility: MEDICAL CENTER | Age: 68
End: 2023-10-03
Attending: INTERNAL MEDICINE
Payer: MEDICARE

## 2023-10-03 DIAGNOSIS — R73.03 PREDIABETES: ICD-10-CM

## 2023-10-03 DIAGNOSIS — E78.00 ELEVATED CHOLESTEROL: ICD-10-CM

## 2023-10-03 LAB
CHOLEST SERPL-MCNC: 218 MG/DL (ref 100–199)
EST. AVERAGE GLUCOSE BLD GHB EST-MCNC: 137 MG/DL
FASTING STATUS PATIENT QL REPORTED: NORMAL
HBA1C MFR BLD: 6.4 % (ref 4–5.6)
HDLC SERPL-MCNC: 70 MG/DL
LDLC SERPL CALC-MCNC: 117 MG/DL
TRIGL SERPL-MCNC: 155 MG/DL (ref 0–149)

## 2023-10-03 PROCEDURE — 80061 LIPID PANEL: CPT

## 2023-10-03 PROCEDURE — 83036 HEMOGLOBIN GLYCOSYLATED A1C: CPT

## 2023-10-03 PROCEDURE — 36415 COLL VENOUS BLD VENIPUNCTURE: CPT

## 2023-10-10 ENCOUNTER — OFFICE VISIT (OUTPATIENT)
Dept: MEDICAL GROUP | Facility: PHYSICIAN GROUP | Age: 68
End: 2023-10-10
Payer: MEDICARE

## 2023-10-10 VITALS
TEMPERATURE: 98.9 F | SYSTOLIC BLOOD PRESSURE: 124 MMHG | DIASTOLIC BLOOD PRESSURE: 76 MMHG | OXYGEN SATURATION: 97 % | WEIGHT: 148.3 LBS | BODY MASS INDEX: 22.48 KG/M2 | HEIGHT: 68 IN | HEART RATE: 73 BPM

## 2023-10-10 DIAGNOSIS — I10 HYPERTENSION, UNSPECIFIED TYPE: ICD-10-CM

## 2023-10-10 DIAGNOSIS — E78.5 DYSLIPIDEMIA: ICD-10-CM

## 2023-10-10 DIAGNOSIS — R73.03 PREDIABETES: ICD-10-CM

## 2023-10-10 PROCEDURE — 99214 OFFICE O/P EST MOD 30 MIN: CPT | Performed by: INTERNAL MEDICINE

## 2023-10-10 PROCEDURE — 3078F DIAST BP <80 MM HG: CPT | Performed by: INTERNAL MEDICINE

## 2023-10-10 PROCEDURE — 3074F SYST BP LT 130 MM HG: CPT | Performed by: INTERNAL MEDICINE

## 2023-10-10 ASSESSMENT — FIBROSIS 4 INDEX: FIB4 SCORE: 1.7

## 2023-10-10 NOTE — PROGRESS NOTES
CC: Follow-up lab work.    HPI:  Yaz presents with the following    1. Prediabetes  6/8/2023:Hemoglobin A1c 6.5, up from 6.0 last year.  Positive family history of diabetes in her mom.  No personal PMH of diabetes.  After seeing her lab results, discontinued ice cream.  Patient is walking at least 20 to 30 minutes a day.  No diabetic medications at this time.    10/10/2023:.  Hemoglobin A1c 6.4, slightly down from 6.5.  Patient started using Metamucil.  Continues to eat a nutritious diet.    2. Dyslipidemia  Patient continues to take fish oil.  Patient not taking statin.  Total cholesterol 218, LDL decreased to 117. Positive coronary artery disease in her grandfather and her mom in her 80s.    3. Hypertension, unspecified type  Blood pressure stable on Cozaar 100 mg tablets daily.      Patient Active Problem List    Diagnosis Date Noted    Dyslipidemia 10/10/2023    Osteopenia of lumbar spine 03/02/2023    Diverticulitis of colon 09/06/2022    Prediabetes 06/07/2022    Hypertension 03/11/2022    Chronic paroxysmal hemicrania, not intractable 03/11/2022    H/O sarcoidosis 03/11/2022    Vitamin D deficiency 03/11/2022    Visual changes 03/11/2022       Current Outpatient Medications   Medication Sig Dispense Refill    psyllium (METAMUCIL) 58.12 % Pack Take 1 Packet by mouth every day.      MAGNESIUM PO       losartan (COZAAR) 100 MG Tab TAKE 1 TABLET BY MOUTH EVERY  Tablet 3    ibuprofen (MOTRIN) 200 MG Tab Take 400 mg by mouth every day.      multivitamin (THERAGRAN) Tab Take 1 Tablet by mouth every day.      Omega-3 Fatty Acids (OMEGA 3 PO) Take 1 Capsule by mouth every day.      CALCIUM PO Take 2 Tablets by mouth every day.       No current facility-administered medications for this visit.         Allergies as of 10/10/2023 - Reviewed 10/10/2023   Allergen Reaction Noted    Iodine Hives 03/11/2022    Other misc Hives 02/06/2023    Penicillins Hives 03/11/2022        Social History     Socioeconomic  "History    Marital status:      Spouse name: Not on file    Number of children: Not on file    Years of education: Not on file    Highest education level: Not on file   Occupational History    Not on file   Tobacco Use    Smoking status: Never    Smokeless tobacco: Never   Vaping Use    Vaping Use: Never used   Substance and Sexual Activity    Alcohol use: Yes     Comment: OCC    Drug use: Never    Sexual activity: Not on file   Other Topics Concern    Not on file   Social History Narrative    Not on file     Social Determinants of Health     Financial Resource Strain: Not on file   Food Insecurity: Not on file   Transportation Needs: Not on file   Physical Activity: Not on file   Stress: Not on file   Social Connections: Not on file   Intimate Partner Violence: Not on file   Housing Stability: Not on file       History reviewed. No pertinent family history.    History reviewed. No pertinent surgical history.    ROS:  Denies any Headache,Chest pain,  Shortness of breath,  Abdominal pain, Changes of bowel or bladder, Lower ext edema, Fevers, Nights sweats, Weight Changes, Focal weakness or numbness.  All other systems are negative.    /76 (BP Location: Right arm, Patient Position: Sitting, BP Cuff Size: Adult)   Pulse 73   Temp 37.2 °C (98.9 °F) (Temporal)   Ht 1.727 m (5' 8\")   Wt 67.3 kg (148 lb 4.8 oz)   SpO2 97%   BMI 22.55 kg/m²      Constitutional: Alert, no distress, well-groomed.  Skin: Warm, dry, good turgor, no rashes in visible areas.  Eye: Equal, round and reactive, conjunctiva clear, lids normal.  ENMT: Lips without lesions, good dentition, moist mucous membranes.  Neck: Trachea midline, no masses, no thyromegaly.  Respiratory: Unlabored respiratory effort, no cough.  Abdomen: Soft, no gross masses.  MSK: Normal gait, moves all extremities.  Neuro: Grossly non-focal. No cranial nerve deficit. Strength and sensation intact.   Psych: Alert and oriented x3, normal affect and " mood.      Assessment and Plan.   67 y.o. female presenting with the following.     1. Prediabetes  Counseled patient on diet and lifestyle modifications.  Possibly add cinnamon and berberine to her supplements.  Monitor A1c in 6 months.  Discussed the benefits of metformin.    2. Dyslipidemia  Patient declines statin.  Continue fish oil.  Counseled patient on diet lifestyle modifications.  Consider CT scan coronary calcium due to positive family history.    3. Hypertension, unspecified type  Continue losartan 100 mg daily.    My total time spent caring for the patient on the day of the encounter was 30 minutes.   This does not include time spent on separately billable procedures/tests.

## 2023-10-26 ENCOUNTER — HOSPITAL ENCOUNTER (INPATIENT)
Facility: MEDICAL CENTER | Age: 68
LOS: 1 days | DRG: 393 | End: 2023-10-27
Attending: EMERGENCY MEDICINE | Admitting: HOSPITALIST
Payer: MEDICARE

## 2023-10-26 ENCOUNTER — APPOINTMENT (OUTPATIENT)
Dept: RADIOLOGY | Facility: MEDICAL CENTER | Age: 68
DRG: 393 | End: 2023-10-26
Attending: EMERGENCY MEDICINE
Payer: MEDICARE

## 2023-10-26 DIAGNOSIS — R10.30 LOWER ABDOMINAL PAIN: ICD-10-CM

## 2023-10-26 DIAGNOSIS — R19.7 BLOODY DIARRHEA: ICD-10-CM

## 2023-10-26 PROBLEM — R93.89 ABNORMAL CT SCAN: Status: ACTIVE | Noted: 2023-10-26

## 2023-10-26 PROBLEM — E86.0 DEHYDRATION: Status: ACTIVE | Noted: 2023-10-26

## 2023-10-26 PROBLEM — K52.9 COLITIS: Status: ACTIVE | Noted: 2023-10-26

## 2023-10-26 LAB
ALBUMIN SERPL BCP-MCNC: 4.5 G/DL (ref 3.2–4.9)
ALBUMIN/GLOB SERPL: 1.6 G/DL
ALP SERPL-CCNC: 56 U/L (ref 30–99)
ALT SERPL-CCNC: 25 U/L (ref 2–50)
ANION GAP SERPL CALC-SCNC: 14 MMOL/L (ref 7–16)
APPEARANCE UR: ABNORMAL
AST SERPL-CCNC: 23 U/L (ref 12–45)
BACTERIA #/AREA URNS HPF: ABNORMAL /HPF
BASOPHILS # BLD AUTO: 0.4 % (ref 0–1.8)
BASOPHILS # BLD: 0.05 K/UL (ref 0–0.12)
BILIRUB SERPL-MCNC: 0.5 MG/DL (ref 0.1–1.5)
BILIRUB UR QL STRIP.AUTO: NEGATIVE
BUN SERPL-MCNC: 22 MG/DL (ref 8–22)
CALCIUM ALBUM COR SERPL-MCNC: 8.6 MG/DL (ref 8.5–10.5)
CALCIUM SERPL-MCNC: 9 MG/DL (ref 8.4–10.2)
CHLORIDE SERPL-SCNC: 99 MMOL/L (ref 96–112)
CO2 SERPL-SCNC: 22 MMOL/L (ref 20–33)
COLOR UR: YELLOW
CREAT SERPL-MCNC: 0.75 MG/DL (ref 0.5–1.4)
EOSINOPHIL # BLD AUTO: 0.01 K/UL (ref 0–0.51)
EOSINOPHIL NFR BLD: 0.1 % (ref 0–6.9)
EPI CELLS #/AREA URNS HPF: ABNORMAL /HPF
ERYTHROCYTE [DISTWIDTH] IN BLOOD BY AUTOMATED COUNT: 42.6 FL (ref 35.9–50)
GFR SERPLBLD CREATININE-BSD FMLA CKD-EPI: 87 ML/MIN/1.73 M 2
GLOBULIN SER CALC-MCNC: 2.8 G/DL (ref 1.9–3.5)
GLUCOSE SERPL-MCNC: 139 MG/DL (ref 65–99)
GLUCOSE UR STRIP.AUTO-MCNC: NEGATIVE MG/DL
HCT VFR BLD AUTO: 40.5 % (ref 37–47)
HGB BLD-MCNC: 13.6 G/DL (ref 12–16)
IMM GRANULOCYTES # BLD AUTO: 0.03 K/UL (ref 0–0.11)
IMM GRANULOCYTES NFR BLD AUTO: 0.2 % (ref 0–0.9)
KETONES UR STRIP.AUTO-MCNC: ABNORMAL MG/DL
LACTATE SERPL-SCNC: 2 MMOL/L (ref 0.5–2)
LEUKOCYTE ESTERASE UR QL STRIP.AUTO: NEGATIVE
LIPASE SERPL-CCNC: 16 U/L (ref 11–82)
LYMPHOCYTES # BLD AUTO: 0.81 K/UL (ref 1–4.8)
LYMPHOCYTES NFR BLD: 6.5 % (ref 22–41)
MCH RBC QN AUTO: 30.5 PG (ref 27–33)
MCHC RBC AUTO-ENTMCNC: 33.6 G/DL (ref 32.2–35.5)
MCV RBC AUTO: 90.8 FL (ref 81.4–97.8)
MICRO URNS: ABNORMAL
MONOCYTES # BLD AUTO: 0.45 K/UL (ref 0–0.85)
MONOCYTES NFR BLD AUTO: 3.6 % (ref 0–13.4)
MUCOUS THREADS #/AREA URNS HPF: ABNORMAL /HPF
NEUTROPHILS # BLD AUTO: 11.06 K/UL (ref 1.82–7.42)
NEUTROPHILS NFR BLD: 89.2 % (ref 44–72)
NITRITE UR QL STRIP.AUTO: NEGATIVE
NRBC # BLD AUTO: 0 K/UL
NRBC BLD-RTO: 0 /100 WBC (ref 0–0.2)
PH UR STRIP.AUTO: 5.5 [PH] (ref 5–8)
PLATELET # BLD AUTO: 332 K/UL (ref 164–446)
PMV BLD AUTO: 9 FL (ref 9–12.9)
POTASSIUM SERPL-SCNC: 4 MMOL/L (ref 3.6–5.5)
PROT SERPL-MCNC: 7.3 G/DL (ref 6–8.2)
PROT UR QL STRIP: NEGATIVE MG/DL
RBC # BLD AUTO: 4.46 M/UL (ref 4.2–5.4)
RBC # URNS HPF: ABNORMAL /HPF
RBC UR QL AUTO: NEGATIVE
SODIUM SERPL-SCNC: 135 MMOL/L (ref 135–145)
SP GR UR STRIP.AUTO: >=1.03
UNIDENT CRYS URNS QL MICRO: ABNORMAL /HPF
WBC # BLD AUTO: 12.4 K/UL (ref 4.8–10.8)
WBC #/AREA URNS HPF: ABNORMAL /HPF

## 2023-10-26 PROCEDURE — 81001 URINALYSIS AUTO W/SCOPE: CPT

## 2023-10-26 PROCEDURE — 700111 HCHG RX REV CODE 636 W/ 250 OVERRIDE (IP): Performed by: EMERGENCY MEDICINE

## 2023-10-26 PROCEDURE — 83630 LACTOFERRIN FECAL (QUAL): CPT

## 2023-10-26 PROCEDURE — 87077 CULTURE AEROBIC IDENTIFY: CPT

## 2023-10-26 PROCEDURE — 87046 STOOL CULTR AEROBIC BACT EA: CPT

## 2023-10-26 PROCEDURE — 36415 COLL VENOUS BLD VENIPUNCTURE: CPT

## 2023-10-26 PROCEDURE — 80053 COMPREHEN METABOLIC PANEL: CPT

## 2023-10-26 PROCEDURE — 700105 HCHG RX REV CODE 258: Performed by: EMERGENCY MEDICINE

## 2023-10-26 PROCEDURE — 87899 AGENT NOS ASSAY W/OPTIC: CPT

## 2023-10-26 PROCEDURE — 83690 ASSAY OF LIPASE: CPT

## 2023-10-26 PROCEDURE — 96375 TX/PRO/DX INJ NEW DRUG ADDON: CPT

## 2023-10-26 PROCEDURE — 87045 FECES CULTURE AEROBIC BACT: CPT

## 2023-10-26 PROCEDURE — 74177 CT ABD & PELVIS W/CONTRAST: CPT

## 2023-10-26 PROCEDURE — A9270 NON-COVERED ITEM OR SERVICE: HCPCS | Performed by: HOSPITALIST

## 2023-10-26 PROCEDURE — 770001 HCHG ROOM/CARE - MED/SURG/GYN PRIV*

## 2023-10-26 PROCEDURE — 99223 1ST HOSP IP/OBS HIGH 75: CPT | Mod: AI | Performed by: HOSPITALIST

## 2023-10-26 PROCEDURE — 96374 THER/PROPH/DIAG INJ IV PUSH: CPT

## 2023-10-26 PROCEDURE — 85025 COMPLETE CBC W/AUTO DIFF WBC: CPT

## 2023-10-26 PROCEDURE — 83605 ASSAY OF LACTIC ACID: CPT

## 2023-10-26 PROCEDURE — 700105 HCHG RX REV CODE 258: Performed by: HOSPITALIST

## 2023-10-26 PROCEDURE — 700102 HCHG RX REV CODE 250 W/ 637 OVERRIDE(OP): Performed by: HOSPITALIST

## 2023-10-26 PROCEDURE — 700117 HCHG RX CONTRAST REV CODE 255: Performed by: EMERGENCY MEDICINE

## 2023-10-26 PROCEDURE — 99285 EMERGENCY DEPT VISIT HI MDM: CPT

## 2023-10-26 PROCEDURE — 94760 N-INVAS EAR/PLS OXIMETRY 1: CPT

## 2023-10-26 RX ORDER — ACETAMINOPHEN 325 MG/1
650 TABLET ORAL EVERY 6 HOURS PRN
Status: DISCONTINUED | OUTPATIENT
Start: 2023-10-26 | End: 2023-10-27 | Stop reason: HOSPADM

## 2023-10-26 RX ORDER — ONDANSETRON 2 MG/ML
4 INJECTION INTRAMUSCULAR; INTRAVENOUS EVERY 4 HOURS PRN
Status: DISCONTINUED | OUTPATIENT
Start: 2023-10-26 | End: 2023-10-27 | Stop reason: HOSPADM

## 2023-10-26 RX ORDER — AMOXICILLIN 250 MG
2 CAPSULE ORAL 2 TIMES DAILY
Status: DISCONTINUED | OUTPATIENT
Start: 2023-10-26 | End: 2023-10-27 | Stop reason: HOSPADM

## 2023-10-26 RX ORDER — SODIUM CHLORIDE, SODIUM LACTATE, POTASSIUM CHLORIDE, CALCIUM CHLORIDE 600; 310; 30; 20 MG/100ML; MG/100ML; MG/100ML; MG/100ML
INJECTION, SOLUTION INTRAVENOUS CONTINUOUS
Status: DISCONTINUED | OUTPATIENT
Start: 2023-10-26 | End: 2023-10-27 | Stop reason: HOSPADM

## 2023-10-26 RX ORDER — ACETAMINOPHEN 500 MG
500-1000 TABLET ORAL EVERY 6 HOURS PRN
COMMUNITY

## 2023-10-26 RX ORDER — SODIUM CHLORIDE, SODIUM LACTATE, POTASSIUM CHLORIDE, CALCIUM CHLORIDE 600; 310; 30; 20 MG/100ML; MG/100ML; MG/100ML; MG/100ML
1000 INJECTION, SOLUTION INTRAVENOUS ONCE
Status: COMPLETED | OUTPATIENT
Start: 2023-10-26 | End: 2023-10-26

## 2023-10-26 RX ORDER — POLYETHYLENE GLYCOL 3350 17 G/17G
1 POWDER, FOR SOLUTION ORAL
Status: DISCONTINUED | OUTPATIENT
Start: 2023-10-26 | End: 2023-10-27 | Stop reason: HOSPADM

## 2023-10-26 RX ORDER — LOSARTAN POTASSIUM 50 MG/1
100 TABLET ORAL
Status: DISCONTINUED | OUTPATIENT
Start: 2023-10-27 | End: 2023-10-27 | Stop reason: HOSPADM

## 2023-10-26 RX ORDER — HYDROMORPHONE HYDROCHLORIDE 1 MG/ML
0.25 INJECTION, SOLUTION INTRAMUSCULAR; INTRAVENOUS; SUBCUTANEOUS
Status: DISCONTINUED | OUTPATIENT
Start: 2023-10-26 | End: 2023-10-27 | Stop reason: HOSPADM

## 2023-10-26 RX ORDER — DEXAMETHASONE SODIUM PHOSPHATE 10 MG/ML
10 INJECTION, SOLUTION INTRAMUSCULAR; INTRAVENOUS ONCE
Status: COMPLETED | OUTPATIENT
Start: 2023-10-26 | End: 2023-10-26

## 2023-10-26 RX ORDER — OXYCODONE HYDROCHLORIDE 5 MG/1
2.5 TABLET ORAL
Status: DISCONTINUED | OUTPATIENT
Start: 2023-10-26 | End: 2023-10-27 | Stop reason: HOSPADM

## 2023-10-26 RX ORDER — OXYCODONE HYDROCHLORIDE 5 MG/1
5 TABLET ORAL
Status: DISCONTINUED | OUTPATIENT
Start: 2023-10-26 | End: 2023-10-27 | Stop reason: HOSPADM

## 2023-10-26 RX ORDER — DIPHENHYDRAMINE HYDROCHLORIDE 50 MG/ML
50 INJECTION INTRAMUSCULAR; INTRAVENOUS ONCE
Status: COMPLETED | OUTPATIENT
Start: 2023-10-26 | End: 2023-10-26

## 2023-10-26 RX ORDER — ONDANSETRON 4 MG/1
4 TABLET, ORALLY DISINTEGRATING ORAL EVERY 4 HOURS PRN
Status: DISCONTINUED | OUTPATIENT
Start: 2023-10-26 | End: 2023-10-27 | Stop reason: HOSPADM

## 2023-10-26 RX ORDER — BISACODYL 10 MG
10 SUPPOSITORY, RECTAL RECTAL
Status: DISCONTINUED | OUTPATIENT
Start: 2023-10-26 | End: 2023-10-27 | Stop reason: HOSPADM

## 2023-10-26 RX ADMIN — IOHEXOL 100 ML: 350 INJECTION, SOLUTION INTRAVENOUS at 11:24

## 2023-10-26 RX ADMIN — SODIUM CHLORIDE, POTASSIUM CHLORIDE, SODIUM LACTATE AND CALCIUM CHLORIDE: 600; 310; 30; 20 INJECTION, SOLUTION INTRAVENOUS at 14:21

## 2023-10-26 RX ADMIN — ACETAMINOPHEN 650 MG: 325 TABLET ORAL at 20:30

## 2023-10-26 RX ADMIN — DEXAMETHASONE SODIUM PHOSPHATE 10 MG: 10 INJECTION, SOLUTION INTRAMUSCULAR; INTRAVENOUS at 10:22

## 2023-10-26 RX ADMIN — DIPHENHYDRAMINE HYDROCHLORIDE 50 MG: 50 INJECTION, SOLUTION INTRAMUSCULAR; INTRAVENOUS at 10:21

## 2023-10-26 RX ADMIN — SODIUM CHLORIDE, POTASSIUM CHLORIDE, SODIUM LACTATE AND CALCIUM CHLORIDE 1000 ML: 600; 310; 30; 20 INJECTION, SOLUTION INTRAVENOUS at 10:22

## 2023-10-26 RX ADMIN — ACETAMINOPHEN 650 MG: 325 TABLET ORAL at 14:25

## 2023-10-26 ASSESSMENT — ENCOUNTER SYMPTOMS
CHILLS: 0
MYALGIAS: 0
EYE DISCHARGE: 0
VOMITING: 0
EYE REDNESS: 0
COUGH: 0
STRIDOR: 0
SHORTNESS OF BREATH: 0
NAUSEA: 1
CHILLS: 1
FEVER: 0
BLOOD IN STOOL: 1
NERVOUS/ANXIOUS: 0
BRUISES/BLEEDS EASILY: 0
FOCAL WEAKNESS: 0
DIARRHEA: 1
FLANK PAIN: 0
ABDOMINAL PAIN: 1

## 2023-10-26 ASSESSMENT — LIFESTYLE VARIABLES
HAVE YOU EVER FELT YOU SHOULD CUT DOWN ON YOUR DRINKING: NO
TOTAL SCORE: 0
ALCOHOL_USE: NO
TOTAL SCORE: 0
AVERAGE NUMBER OF DAYS PER WEEK YOU HAVE A DRINK CONTAINING ALCOHOL: 0
ON A TYPICAL DAY WHEN YOU DRINK ALCOHOL HOW MANY DRINKS DO YOU HAVE: 0
EVER FELT BAD OR GUILTY ABOUT YOUR DRINKING: NO
HAVE PEOPLE ANNOYED YOU BY CRITICIZING YOUR DRINKING: NO
EVER HAD A DRINK FIRST THING IN THE MORNING TO STEADY YOUR NERVES TO GET RID OF A HANGOVER: NO
TOTAL SCORE: 0
CONSUMPTION TOTAL: NEGATIVE
HOW MANY TIMES IN THE PAST YEAR HAVE YOU HAD 5 OR MORE DRINKS IN A DAY: 0

## 2023-10-26 ASSESSMENT — COGNITIVE AND FUNCTIONAL STATUS - GENERAL
SUGGESTED CMS G CODE MODIFIER MOBILITY: CH
MOBILITY SCORE: 24
DAILY ACTIVITIY SCORE: 24
SUGGESTED CMS G CODE MODIFIER DAILY ACTIVITY: CH

## 2023-10-26 ASSESSMENT — PATIENT HEALTH QUESTIONNAIRE - PHQ9
2. FEELING DOWN, DEPRESSED, IRRITABLE, OR HOPELESS: NOT AT ALL
SUM OF ALL RESPONSES TO PHQ9 QUESTIONS 1 AND 2: 0
1. LITTLE INTEREST OR PLEASURE IN DOING THINGS: NOT AT ALL

## 2023-10-26 ASSESSMENT — FIBROSIS 4 INDEX
FIB4 SCORE: 1.7
FIB4 SCORE: 0.91

## 2023-10-26 ASSESSMENT — PAIN DESCRIPTION - PAIN TYPE
TYPE: ACUTE PAIN

## 2023-10-26 NOTE — ED PROVIDER NOTES
ED Provider Note    CHIEF COMPLAINT  Chief Complaint   Patient presents with    Abdominal Pain     Pt states that this morning at 0030 she developed lower abd pain that has been constant since onset. No radiating. She has had about multiple episodes of diarrhea that turned into bright red bloody diarrhea around 0400. Nausea without vomiting.   No blood thinners.   History of diverticulitis.        EXTERNAL RECORDS REVIEWED  Outpatient Notes primary care note reviewed.    HPI/ROS  LIMITATION TO HISTORY   Select: : None  OUTSIDE HISTORIAN(S):  none    Yaz Naylor is a 67 y.o. female who presents for abdominal pain and diarrhea.  Patient says she started acutely with cramping around 12:30 at night she had some nausea no vomiting.  This continued through the evening and then this morning and then around 4:00 in the morning the stool turned to straight blood.  She says its bloody mucus and she gets cramps and has to go every half an hour.  She denies any fevers.  Last colonoscopy was within the last year with Dr. Carrington    PAST MEDICAL HISTORY   has a past medical history of Chronic paroxysmal hemicrania, not intractable (3/11/2022), Dyslipidemia (10/10/2023), Essential hypertension (3/11/2022), H/O sarcoidosis (3/11/2022), Osteopenia of lumbar spine (3/2/2023), Prediabetes (6/7/2022), Sigmoid diverticulitis (9/20/2022), Visual changes (3/11/2022), and Vitamin D deficiency (3/11/2022).    SURGICAL HISTORY  patient denies any surgical history    FAMILY HISTORY  No family history on file.    SOCIAL HISTORY  Social History     Tobacco Use    Smoking status: Never    Smokeless tobacco: Never   Vaping Use    Vaping Use: Never used   Substance and Sexual Activity    Alcohol use: Yes     Comment: OCC    Drug use: Never    Sexual activity: Not on file       CURRENT MEDICATIONS  Home Medications       Reviewed by Terri Gonzalez R.N. (Registered Nurse) on 10/26/23 at 1401  Med List Status: Complete     Medication Last Dose  "Status   acetaminophen (TYLENOL) 500 MG Tab 10/25/2023 Active   Berberine Chloride (BERBERINE HCI PO) 10/25/2023 Active   CALCIUM PO 10/25/2023 Active   losartan (COZAAR) 100 MG Tab 10/26/2023 Active   MAGNESIUM PO 10/25/2023 Active   multivitamin (THERAGRAN) Tab 10/25/2023 Active   Omega-3 Fatty Acids (OMEGA 3 PO) 10/25/2023 Active                    ALLERGIES  Allergies   Allergen Reactions    Iodine Hives    Penicillins Hives       PHYSICAL EXAM  VITAL SIGNS: BP (!) 156/101   Pulse 89   Temp 36.1 °C (97 °F) (Temporal)   Resp 17   Ht 1.753 m (5' 9\")   Wt 67.7 kg (149 lb 4 oz)   SpO2 100%   BMI 22.04 kg/m²    Constitutional: Alert in no apparent distress.  HENT: No signs of trauma, Bilateral external ears normal, Nose normal.   Eyes: Pupils are equal and reactive, Conjunctiva normal, Non-icteric.   Neck: Normal range of motion, No tenderness, Supple, No stridor.   Cardiovascular: Regular rate and rhythm, no murmurs.   Thorax & Lungs: Normal breath sounds, No respiratory distress, No wheezing, No chest tenderness.   Abdomen: Bowel sounds normal, Soft, No tenderness, No masses, No peritoneal signs.  Skin: Warm, Dry, No erythema, No rash.   Musculoskeletal:  No major deformities noted.   Neurologic: Alert, moving all extremities without difficulty, no focal deficits.      DIAGNOSTIC STUDIES / PROCEDURES      LABS  Results for orders placed or performed during the hospital encounter of 10/26/23   CBC WITH DIFFERENTIAL   Result Value Ref Range    WBC 12.4 (H) 4.8 - 10.8 K/uL    RBC 4.46 4.20 - 5.40 M/uL    Hemoglobin 13.6 12.0 - 16.0 g/dL    Hematocrit 40.5 37.0 - 47.0 %    MCV 90.8 81.4 - 97.8 fL    MCH 30.5 27.0 - 33.0 pg    MCHC 33.6 32.2 - 35.5 g/dL    RDW 42.6 35.9 - 50.0 fL    Platelet Count 332 164 - 446 K/uL    MPV 9.0 9.0 - 12.9 fL    Neutrophils-Polys 89.20 (H) 44.00 - 72.00 %    Lymphocytes 6.50 (L) 22.00 - 41.00 %    Monocytes 3.60 0.00 - 13.40 %    Eosinophils 0.10 0.00 - 6.90 %    Basophils 0.40 " 0.00 - 1.80 %    Immature Granulocytes 0.20 0.00 - 0.90 %    Nucleated RBC 0.00 0.00 - 0.20 /100 WBC    Neutrophils (Absolute) 11.06 (H) 1.82 - 7.42 K/uL    Lymphs (Absolute) 0.81 (L) 1.00 - 4.80 K/uL    Monos (Absolute) 0.45 0.00 - 0.85 K/uL    Eos (Absolute) 0.01 0.00 - 0.51 K/uL    Baso (Absolute) 0.05 0.00 - 0.12 K/uL    Immature Granulocytes (abs) 0.03 0.00 - 0.11 K/uL    NRBC (Absolute) 0.00 K/uL   COMP METABOLIC PANEL   Result Value Ref Range    Sodium 135 135 - 145 mmol/L    Potassium 4.0 3.6 - 5.5 mmol/L    Chloride 99 96 - 112 mmol/L    Co2 22 20 - 33 mmol/L    Anion Gap 14.0 7.0 - 16.0    Glucose 139 (H) 65 - 99 mg/dL    Bun 22 8 - 22 mg/dL    Creatinine 0.75 0.50 - 1.40 mg/dL    Calcium 9.0 8.4 - 10.2 mg/dL    Correct Calcium 8.6 8.5 - 10.5 mg/dL    AST(SGOT) 23 12 - 45 U/L    ALT(SGPT) 25 2 - 50 U/L    Alkaline Phosphatase 56 30 - 99 U/L    Total Bilirubin 0.5 0.1 - 1.5 mg/dL    Albumin 4.5 3.2 - 4.9 g/dL    Total Protein 7.3 6.0 - 8.2 g/dL    Globulin 2.8 1.9 - 3.5 g/dL    A-G Ratio 1.6 g/dL   LIPASE   Result Value Ref Range    Lipase 16 11 - 82 U/L   URINALYSIS CULTURE, IF INDICATED    Specimen: Urine, Clean Catch   Result Value Ref Range    Color Yellow     Character Turbid (A)     Specific Gravity >=1.030 <1.035    Ph 5.5 5.0 - 8.0    Glucose Negative Negative mg/dL    Ketones Trace (A) Negative mg/dL    Protein Negative Negative mg/dL    Bilirubin Negative Negative    Nitrite Negative Negative    Leukocyte Esterase Negative Negative    Occult Blood Negative Negative    Micro Urine Req Microscopic    ESTIMATED GFR   Result Value Ref Range    GFR (CKD-EPI) 87 >60 mL/min/1.73 m 2   URINE MICROSCOPIC (W/UA)   Result Value Ref Range    WBC 0-2 /hpf    RBC 0-2 /hpf    Bacteria Rare (A) None /hpf    Epithelial Cells Few Few /hpf    Mucous Threads Few /hpf    Urine Crystals Many Amorphous /hpf   LACTIC ACID   Result Value Ref Range    Lactic Acid 2.0 0.5 - 2.0 mmol/L         RADIOLOGY  I have independently  interpreted the diagnostic imaging associated with this visit and am waiting the final reading from the radiologist.   My preliminary interpretation is as follows: no obvious abnormality  Radiologist interpretation:   CT-ABDOMEN-PELVIS WITH   Final Result      Moderate distal diverticulosis of the colon without focal inflammatory change to confirm acute diverticulitis. Early diverticulitis cannot be excluded      Incidental likely leiomyoma exophytic from the right uterine fundus favored over an adjacent mass. Pelvic ultrasound could likely clarify this and could be performed as an outpatient.      Parapelvic renal and left hepatic cysts are without concerning change            COURSE & MEDICAL DECISION MAKING    ED Observation Status? Yes; I am placing the patient in to an observation status due to a diagnostic uncertainty as well as therapeutic intensity. Patient placed in observation status at 9:23 AM, 10/26/2023.     Observation plan is as follows: labs and imaging    Upon Reevaluation, the patient's condition has: not improved; and will be escalated to hospitalization.    Patient discharged from ED Observation status at 12pn (Time) 10/26/2023 (Date).     INITIAL ASSESSMENT, COURSE AND PLAN  Care Narrative:   This is a 67-year-old female that presents with abdominal pain and bloody diarrhea.    DDx includes but is not limited to infectious diarrhea, diverticulitis, colitis    Labs were performed she has a white count of 12.4 hemoglobin is normal at 13.6.   Discussed with patient her iodine contrast allergy.  She says when she was 20 she thinks she had hives to diet he is to check out her kidneys.  She is agreeable to trying premedication and getting a CT scan here.    Patient had no issues with the CT scan with premedication of Benadryl and Solu-Medrol.    CT did not show any evidence of diverticulitis.  She did have bowel movement that showed mucousy gelatinous red substance.  Lactic was added for concern of  possible mesenteric ischemia and this was normal.  I did speak with Dr. Quiroz, GI who recommended hospitalization for continued observation and they will consult.  Patient was ultimately agreeable to this.  Patient will be hospitalized in guarded condition.      DISPOSITION AND DISCUSSIONS  I have discussed management of the patient with the following physicians and RENATE's:  Dr. Quiroz    Discussion of management with other [facility] or appropriate source(s): None         FINAL DIAGNOSIS  1. Lower abdominal pain    2. Bloody diarrhea           Electronically signed by: Shruthi Christianson M.D., 10/26/2023 9:21 AM

## 2023-10-26 NOTE — ED NOTES
Pharmacy Medication Reconciliation      ~Medication reconciliation updated and complete per patient at bedside   ~Allergies have been verified and updated   ~No oral ABX within the last 30 days  ~Patient home pharmacy :  [facility]      ~Anticoagulants (rivaroxaban, apixaban, edoxaban, dabigatran, warfarin, enoxaparin) taken in the last 14 days? No

## 2023-10-26 NOTE — ED NOTES
Patient walks in ambulatory complaining of lower central quadrant abomdinal pain. Patient has a history of diverticulitis. Patient says the pain started 0030 with nausea and dirrhea .

## 2023-10-26 NOTE — ASSESSMENT & PLAN NOTE
Likely secondary to reduced oral intake, and increase in insensible loss due to colitis.  Encourage oral intake as tolerated, antiemetics as needed.  Intravenous hydration until adequate oral intake is achieved.

## 2023-10-26 NOTE — ASSESSMENT & PLAN NOTE
CT shows moderate distal diverticulosis of the colon without focal inflammatory change to confirm acute diverticulitis. Early diverticulitis cannot be excluded.  Bowel rest with a modified diet  Supportive care with intravenous fluids.  We will watch clinically off antibiotics  GI consulted, appreciate recommendations

## 2023-10-26 NOTE — CONSULTS
Gastroenterology Consultation    Date of Service  10/26/2023    Referring Physician  Pato Boland M.D.    Consulting Physician  Maynor Quiroz M.D.    Reason for Consultation  Bloody diarrhea    History of Presenting Illness  67 y.o. female with HTN who presented 10/26/2023 with abdominal pain and bloody diarrhea.    She awoke last night at 12:30 AM with acute onset diaphoresis, crampy mid-abdominal pain followed by diarrhea and then multiple episodes of bloody diarrhea.  Last episodes around noon today.  Pain has improved some.  No fevers or chills.  No prior episodes but has had diverticulitis in past which feels different.  Had shrimp omellette last night but no one else sick around her and  had same food.    Last colonoscopy 1/2023 with left sided diverticulosis.    Review of Systems  Review of Systems   Constitutional:  Positive for chills.   Gastrointestinal:  Positive for abdominal pain, blood in stool, diarrhea and nausea.   All other systems reviewed and are negative.      Past Medical History   has a past medical history of Chronic paroxysmal hemicrania, not intractable (3/11/2022), Dyslipidemia (10/10/2023), Essential hypertension (3/11/2022), H/O sarcoidosis (3/11/2022), Osteopenia of lumbar spine (3/2/2023), Prediabetes (6/7/2022), Sigmoid diverticulitis (9/20/2022), Visual changes (3/11/2022), and Vitamin D deficiency (3/11/2022).    Surgical History   has no past surgical history on file.    Family History  family history is not on file.    Social History   reports that she has never smoked. She has never used smokeless tobacco. She reports current alcohol use. She reports that she does not use drugs.    Medications    Current Facility-Administered Medications:     [START ON 10/27/2023] losartan    acetaminophen    senna-docusate **AND** polyethylene glycol/lytes **AND** magnesium hydroxide **AND** bisacodyl    LR    Notify provider if pain remains uncontrolled **AND** Use the Numeric  Rating Scale (NRS), Clark-Baker Faces (WBF), or FLACC on regular floors and Critical-Care Pain Observation Tool (CPOT) on ICUs/Trauma to assess pain **AND** Pulse Ox **AND** Pharmacy Consult Request **AND** If patient difficult to arouse and/or has respiratory depression (respiratory rate of 10 or less), stop any opiates that are currently infusing and call a Rapid Response.    oxyCODONE immediate-release **OR** oxyCODONE immediate-release **OR** HYDROmorphone    ondansetron    ondansetron    Medications Prior to Admission   Medication Sig Dispense Refill Last Dose    Berberine Chloride (BERBERINE HCI PO) Take 1 Tablet by mouth 2 times a day.   10/25/2023 at AM    acetaminophen (TYLENOL) 500 MG Tab Take 500-1,000 mg by mouth every 6 hours as needed for Moderate Pain.   10/25/2023 at PRN    MAGNESIUM PO Take 1 Tablet by mouth every morning.   10/25/2023 at AM    losartan (COZAAR) 100 MG Tab TAKE 1 TABLET BY MOUTH EVERY  Tablet 3 10/26/2023 at 0600    multivitamin (THERAGRAN) Tab Take 1 Tablet by mouth every morning.   10/25/2023 at AM    Omega-3 Fatty Acids (OMEGA 3 PO) Take 1 Capsule by mouth every morning.   10/25/2023 at AM    CALCIUM PO Take 2 Tablets by mouth every morning.   10/25/2023 at AM         Allergies  Allergies   Allergen Reactions    Iodine Hives    Penicillins Hives       Physical Exam  Temp:  [36.1 °C (97 °F)] 36.1 °C (97 °F)  Pulse:  [89] 89  Resp:  [17] 17  BP: (156)/(101) 156/101  SpO2:  [100 %] 100 %    Physical Exam  Vitals and nursing note reviewed.   Constitutional:       Appearance: Normal appearance.   HENT:      Head: Normocephalic and atraumatic.      Mouth/Throat:      Mouth: Mucous membranes are dry.      Pharynx: Oropharynx is clear.   Eyes:      Extraocular Movements: Extraocular movements intact.      Conjunctiva/sclera: Conjunctivae normal.      Pupils: Pupils are equal, round, and reactive to light.   Cardiovascular:      Rate and Rhythm: Normal rate and regular rhythm.       Pulses: Normal pulses.      Heart sounds: Normal heart sounds.   Pulmonary:      Effort: Pulmonary effort is normal. No respiratory distress.      Breath sounds: Normal breath sounds. No wheezing.   Abdominal:      General: Abdomen is flat. Bowel sounds are decreased. There is no distension.      Palpations: Abdomen is soft. There is no mass.      Tenderness: There is abdominal tenderness in the epigastric area and left upper quadrant. There is no guarding or rebound.      Hernia: No hernia is present.   Musculoskeletal:      Right lower leg: No edema.      Left lower leg: No edema.   Skin:     General: Skin is warm and dry.   Neurological:      General: No focal deficit present.      Mental Status: She is alert and oriented to person, place, and time.   Psychiatric:         Mood and Affect: Mood normal.         Behavior: Behavior normal.         Fluids      Laboratory  Recent Labs     10/26/23  0950   WBC 12.4*   RBC 4.46   HEMOGLOBIN 13.6   HEMATOCRIT 40.5   MCV 90.8   MCH 30.5   MCHC 33.6   RDW 42.6   PLATELETCT 332   MPV 9.0     Recent Labs     10/26/23  0950   SODIUM 135   POTASSIUM 4.0   CHLORIDE 99   CO2 22   GLUCOSE 139*   BUN 22   CREATININE 0.75   CALCIUM 9.0                     Imaging  CT-ABDOMEN-PELVIS WITH   Final Result      Moderate distal diverticulosis of the colon without focal inflammatory change to confirm acute diverticulitis. Early diverticulitis cannot be excluded      Incidental likely leiomyoma exophytic from the right uterine fundus favored over an adjacent mass. Pelvic ultrasound could likely clarify this and could be performed as an outpatient.      Parapelvic renal and left hepatic cysts are without concerning change            Assessment/Plan  66 y/o with HTN that presented for abdominal pain and bloody diarrhea.  History and exam findings consistent with mild ischemic colitis and less likely infectious colitis.      PROBLEMS:  Abdominal pain, generalized  Bloody  diarrhea  Leukocytosis  Diverticulosis  Hypertension    PLAN:  IVF for hydration  Stool studies for infection  No need for antibiotics  Clear liquid diet  Observe overnight  Stop berberine supplement

## 2023-10-26 NOTE — ASSESSMENT & PLAN NOTE
CT imaging show evidence for incidental mastitis likely leiomyoma exophytic from the right uterine fundus favored over an adjacent mass.   Findings discussed with the patient and patient family present at bedside in the emergency room  I recommended follow-up with outpatient pelvic ultrasound, gynecology evaluation

## 2023-10-26 NOTE — ED TRIAGE NOTES
Chief Complaint   Patient presents with    Abdominal Pain     Pt states that this morning at 0030 she developed lower abd pain that has been constant since onset. No radiating. She has had about multiple episodes of diarrhea that turned into bright red bloody diarrhea around 0400. Nausea without vomiting.   No blood thinners.   History of diverticulitis.

## 2023-10-26 NOTE — H&P
[facility] Medicine History & Physical Note    Date of Service  10/26/2023    Primary Care Physician  Radha Guerrero M.D.    Consultants  Gastroenterology, Dr. Quiroz    Code Status  Full Code     Chief Complaint  Chief Complaint   Patient presents with    Abdominal Pain     Pt states that this morning at 0030 she developed lower abd pain that has been constant since onset. No radiating. She has had about multiple episodes of diarrhea that turned into bright red bloody diarrhea around 0400. Nausea without vomiting.   No blood thinners.   History of diverticulitis.      History of Presenting Illness  Yaz Naylor is a 67 y.o. female with a past medical history of primary hypertension who presented 10/26/2023 with abdominal pain and nausea, but no vomiting. The pain is campy in character. She also has been having bloody stools.        I discussed the plan of care with emergency department physician, the patient and patient family present at bedside in the emergency room    Review of Systems  Review of Systems   Constitutional:  Negative for chills and fever.   Eyes:  Negative for discharge and redness.   Respiratory:  Negative for cough, shortness of breath and stridor.    Cardiovascular:  Negative for chest pain and leg swelling.   Gastrointestinal:  Positive for abdominal pain, blood in stool and nausea. Negative for melena and vomiting.   Genitourinary:  Negative for flank pain.   Musculoskeletal:  Negative for myalgias.   Skin: Negative.    Neurological:  Negative for focal weakness.   Endo/Heme/Allergies:  Does not bruise/bleed easily.   Psychiatric/Behavioral:  The patient is not nervous/anxious.      Past Medical History   has a past medical history of Chronic paroxysmal hemicrania, not intractable (3/11/2022), Dyslipidemia (10/10/2023), Essential hypertension (3/11/2022), H/O sarcoidosis (3/11/2022), Osteopenia of lumbar spine (3/2/2023), Prediabetes (6/7/2022), Sigmoid diverticulitis (9/20/2022), Visual  changes (3/11/2022), and Vitamin D deficiency (3/11/2022).    Surgical History   has no past surgical history on file.     Family History  family history is not on file.      Social History   reports that she has never smoked. She has never used smokeless tobacco. She reports current alcohol use. She reports that she does not use drugs.    Allergies  Allergies   Allergen Reactions    Iodine Hives    Penicillins Hives     Medications  Prior to Admission Medications   Prescriptions Last Dose Informant Patient Reported? Taking?   Berberine Chloride (BERBERINE HCI PO) 10/25/2023 at AM Patient Yes Yes   Sig: Take 1 Tablet by mouth 2 times a day.   CALCIUM PO 10/25/2023 at AM Patient Yes No   Sig: Take 2 Tablets by mouth every morning.   MAGNESIUM PO 10/25/2023 at AM Patient Yes No   Sig: Take 1 Tablet by mouth every morning.   Omega-3 Fatty Acids (OMEGA 3 PO) 10/25/2023 at AM Patient Yes No   Sig: Take 1 Capsule by mouth every morning.   acetaminophen (TYLENOL) 500 MG Tab 10/25/2023 at PRN Patient Yes Yes   Sig: Take 500-1,000 mg by mouth every 6 hours as needed for Moderate Pain.   losartan (COZAAR) 100 MG Tab 10/26/2023 at 0600 Patient No No   Sig: TAKE 1 TABLET BY MOUTH EVERY DAY   multivitamin (THERAGRAN) Tab 10/25/2023 at AM Patient Yes No   Sig: Take 1 Tablet by mouth every morning.      Facility-Administered Medications: None     Physical Exam  Temp:  [36.1 °C (97 °F)-36.7 °C (98.1 °F)] 36.7 °C (98.1 °F)  Pulse:  [89-95] 95  Resp:  [17] 17  BP: (143-156)/() 143/85  SpO2:  [95 %-100 %] 95 %  Blood Pressure : (!) 156/101   Temperature: 36.1 °C (97 °F)   Pulse: 89   Respiration: 17   Pulse Oximetry: 100 %     Physical Exam  Constitutional:       General: She is not in acute distress.  HENT:      Head: Normocephalic and atraumatic.      Right Ear: External ear normal.      Left Ear: External ear normal.      Nose: No congestion or rhinorrhea.      Mouth/Throat:      Mouth: Mucous membranes are moist.       "Pharynx: No oropharyngeal exudate or posterior oropharyngeal erythema.   Eyes:      General: No scleral icterus.        Right eye: No discharge.         Left eye: No discharge.      Conjunctiva/sclera: Conjunctivae normal.      Pupils: Pupils are equal, round, and reactive to light.   Cardiovascular:      Rate and Rhythm: Normal rate and regular rhythm.      Heart sounds:      No friction rub. No gallop.   Pulmonary:      Effort: Pulmonary effort is normal.   Abdominal:      General: Abdomen is flat. There is no distension.      Tenderness: There is abdominal tenderness. There is no guarding.   Musculoskeletal:         General: No swelling.      Cervical back: Neck supple. No rigidity. No muscular tenderness.      Right lower leg: No edema.      Left lower leg: No edema.   Skin:     Capillary Refill: Capillary refill takes 2 to 3 seconds.      Coloration: Skin is not jaundiced or pale.      Findings: No bruising or erythema.   Neurological:      Mental Status: She is alert and oriented to person, place, and time.   Psychiatric:         Mood and Affect: Mood normal.         Judgment: Judgment normal.       Laboratory:  Recent Labs     10/26/23  0950   WBC 12.4*   RBC 4.46   HEMOGLOBIN 13.6   HEMATOCRIT 40.5   MCV 90.8   MCH 30.5   MCHC 33.6   RDW 42.6   PLATELETCT 332   MPV 9.0     Recent Labs     10/26/23  0950   SODIUM 135   POTASSIUM 4.0   CHLORIDE 99   CO2 22   GLUCOSE 139*   BUN 22   CREATININE 0.75   CALCIUM 9.0     Recent Labs     10/26/23  0950   ALTSGPT 25   ASTSGOT 23   ALKPHOSPHAT 56   TBILIRUBIN 0.5   LIPASE 16   GLUCOSE 139*         No results for input(s): \"NTPROBNP\" in the last 72 hours.      No results for input(s): \"TROPONINT\" in the last 72 hours.    Imaging:  CT-ABDOMEN-PELVIS WITH   Final Result      Moderate distal diverticulosis of the colon without focal inflammatory change to confirm acute diverticulitis. Early diverticulitis cannot be excluded      Incidental likely leiomyoma exophytic from " the right uterine fundus favored over an adjacent mass. Pelvic ultrasound could likely clarify this and could be performed as an outpatient.      Parapelvic renal and left hepatic cysts are without concerning change        Assessment/Plan:  Justification for Admission Status  I anticipate this patient will require at least two midnights for appropriate medical management, necessitating inpatient admission because patient has colitis with bleeding, will require modified diet, intravenous fluids, GI consultation and possible scope evaluation    Patient will need a Med/Surg bed on MEDICAL service.  The patient has colitis with bleeding    * Colitis- (present on admission)  Assessment & Plan  CT shows moderate distal diverticulosis of the colon without focal inflammatory change to confirm acute diverticulitis. Early diverticulitis cannot be excluded.  Bowel rest with a modified diet  Supportive care with intravenous fluids.  We will watch clinically off antibiotics  GI consulted, appreciate recommendations      Abnormal CT scan of the abdomen concerning for uterine/pelvic mass- (present on admission)  Assessment & Plan  CT imaging show evidence for incidental mastitis likely leiomyoma exophytic from the right uterine fundus favored over an adjacent mass.   Findings discussed with the patient and patient family present at bedside in the emergency room  I recommended follow-up with outpatient pelvic ultrasound, gynecology evaluation    Dehydration- (present on admission)  Assessment & Plan  Likely secondary to reduced oral intake, and increase in insensible loss due to colitis.  Encourage oral intake as tolerated, antiemetics as needed.  Intravenous hydration until adequate oral intake is achieved.     Prediabetes- (present on admission)  Assessment & Plan  Diet controlled  Not on diabetic medications at home  Diabetic diet when able to take orally    Primary hypertension- (present on admission)  Assessment & Plan  Resume  losartan with hold parameters    Dyslipidemia- (present on admission)  Assessment & Plan  Cardiac diet when able to take orally   Hold omega-3 for now    VTE prophylaxis: SCDs/TEDs

## 2023-10-27 VITALS
TEMPERATURE: 98.3 F | WEIGHT: 149.25 LBS | RESPIRATION RATE: 18 BRPM | OXYGEN SATURATION: 97 % | HEIGHT: 69 IN | DIASTOLIC BLOOD PRESSURE: 79 MMHG | HEART RATE: 87 BPM | SYSTOLIC BLOOD PRESSURE: 140 MMHG | BODY MASS INDEX: 22.11 KG/M2

## 2023-10-27 LAB
ALBUMIN SERPL BCP-MCNC: 4.1 G/DL (ref 3.2–4.9)
ALBUMIN/GLOB SERPL: 1.6 G/DL
ALP SERPL-CCNC: 52 U/L (ref 30–99)
ALT SERPL-CCNC: 25 U/L (ref 2–50)
ANION GAP SERPL CALC-SCNC: 14 MMOL/L (ref 7–16)
AST SERPL-CCNC: 20 U/L (ref 12–45)
BILIRUB SERPL-MCNC: 0.5 MG/DL (ref 0.1–1.5)
BUN SERPL-MCNC: 13 MG/DL (ref 8–22)
CALCIUM ALBUM COR SERPL-MCNC: 9 MG/DL (ref 8.5–10.5)
CALCIUM SERPL-MCNC: 9.1 MG/DL (ref 8.4–10.2)
CHLORIDE SERPL-SCNC: 103 MMOL/L (ref 96–112)
CO2 SERPL-SCNC: 20 MMOL/L (ref 20–33)
CREAT SERPL-MCNC: 0.73 MG/DL (ref 0.5–1.4)
ERYTHROCYTE [DISTWIDTH] IN BLOOD BY AUTOMATED COUNT: 43.1 FL (ref 35.9–50)
GFR SERPLBLD CREATININE-BSD FMLA CKD-EPI: 90 ML/MIN/1.73 M 2
GLOBULIN SER CALC-MCNC: 2.6 G/DL (ref 1.9–3.5)
GLUCOSE SERPL-MCNC: 113 MG/DL (ref 65–99)
HCT VFR BLD AUTO: 37.9 % (ref 37–47)
HGB BLD-MCNC: 13 G/DL (ref 12–16)
LACTOFERRIN STL QL IA: POSITIVE
MAGNESIUM SERPL-MCNC: 1.8 MG/DL (ref 1.5–2.5)
MCH RBC QN AUTO: 30.6 PG (ref 27–33)
MCHC RBC AUTO-ENTMCNC: 34.3 G/DL (ref 32.2–35.5)
MCV RBC AUTO: 89.2 FL (ref 81.4–97.8)
PLATELET # BLD AUTO: 349 K/UL (ref 164–446)
PMV BLD AUTO: 9.7 FL (ref 9–12.9)
POTASSIUM SERPL-SCNC: 4.2 MMOL/L (ref 3.6–5.5)
PROT SERPL-MCNC: 6.7 G/DL (ref 6–8.2)
RBC # BLD AUTO: 4.25 M/UL (ref 4.2–5.4)
SODIUM SERPL-SCNC: 137 MMOL/L (ref 135–145)
WBC # BLD AUTO: 12.2 K/UL (ref 4.8–10.8)

## 2023-10-27 PROCEDURE — 83735 ASSAY OF MAGNESIUM: CPT

## 2023-10-27 PROCEDURE — 700105 HCHG RX REV CODE 258: Performed by: HOSPITALIST

## 2023-10-27 PROCEDURE — 700102 HCHG RX REV CODE 250 W/ 637 OVERRIDE(OP): Performed by: HOSPITALIST

## 2023-10-27 PROCEDURE — 99239 HOSP IP/OBS DSCHRG MGMT >30: CPT | Performed by: INTERNAL MEDICINE

## 2023-10-27 PROCEDURE — 94760 N-INVAS EAR/PLS OXIMETRY 1: CPT

## 2023-10-27 PROCEDURE — A9270 NON-COVERED ITEM OR SERVICE: HCPCS | Performed by: HOSPITALIST

## 2023-10-27 PROCEDURE — 700111 HCHG RX REV CODE 636 W/ 250 OVERRIDE (IP): Mod: JZ | Performed by: INTERNAL MEDICINE

## 2023-10-27 PROCEDURE — 80053 COMPREHEN METABOLIC PANEL: CPT

## 2023-10-27 PROCEDURE — 85027 COMPLETE CBC AUTOMATED: CPT

## 2023-10-27 RX ORDER — MAGNESIUM SULFATE HEPTAHYDRATE 40 MG/ML
2 INJECTION, SOLUTION INTRAVENOUS ONCE
Status: DISCONTINUED | OUTPATIENT
Start: 2023-10-27 | End: 2023-10-27 | Stop reason: HOSPADM

## 2023-10-27 RX ORDER — DICYCLOMINE HCL 20 MG
10 TABLET ORAL 2 TIMES DAILY PRN
Status: DISCONTINUED | OUTPATIENT
Start: 2023-10-27 | End: 2023-10-27

## 2023-10-27 RX ADMIN — SODIUM CHLORIDE, POTASSIUM CHLORIDE, SODIUM LACTATE AND CALCIUM CHLORIDE: 600; 310; 30; 20 INJECTION, SOLUTION INTRAVENOUS at 00:55

## 2023-10-27 RX ADMIN — LOSARTAN POTASSIUM 100 MG: 50 TABLET, FILM COATED ORAL at 05:11

## 2023-10-27 RX ADMIN — ACETAMINOPHEN 650 MG: 325 TABLET ORAL at 03:23

## 2023-10-27 ASSESSMENT — ENCOUNTER SYMPTOMS
MUSCULOSKELETAL NEGATIVE: 1
BLOOD IN STOOL: 0
RESPIRATORY NEGATIVE: 1
NAUSEA: 0
ABDOMINAL PAIN: 0
CHILLS: 0
DIARRHEA: 0
CARDIOVASCULAR NEGATIVE: 1

## 2023-10-27 ASSESSMENT — PATIENT HEALTH QUESTIONNAIRE - PHQ9
SUM OF ALL RESPONSES TO PHQ9 QUESTIONS 1 AND 2: 0
1. LITTLE INTEREST OR PLEASURE IN DOING THINGS: NOT AT ALL

## 2023-10-27 ASSESSMENT — PAIN DESCRIPTION - PAIN TYPE: TYPE: ACUTE PAIN

## 2023-10-27 NOTE — PROGRESS NOTES
Received report from night shift RN. Assumed pt care 0700  Pt is A&Ox4, resting comfortably in bed. Plan of care reviewed for activities and goals this shift. Medication eduction provided.  Pt verbalizes understanding of plan of care for this shift.  Pt denies pain and/or nausea at this moment.  Patients needs attended well. Fall precautions in place. Bed at lowest position. Call light and personal belongings within reach.   Hourly rounding in progress.

## 2023-10-27 NOTE — PROGRESS NOTES
Called lab at 21:52 to verify if they received stool sample per criss Villarreal, they did not receive it.   Will recollect stool sample from patient.    Specimen container placed in pts room.    00:28 spoke to Ila from lab, they found the stool sample previously sent, however, they can only run 1 test with that amount. Informed lab to run stool culture first. This RN will send another stool sample once available.

## 2023-10-27 NOTE — PROGRESS NOTES
4 Eyes Skin Assessment Completed by Tammy RN and Earl RN.    Head WDL  Ears WDL  Nose WDL  Mouth WDL  Neck WDL  Breast/Chest WDL  Shoulder Blades WDL  Spine WDL  (R) Arm/Elbow/Hand WDL  (L) Arm/Elbow/Hand WDL  Abdomen WDL  Groin WDL  Scrotum/Coccyx/Buttocks WDL  (R) Leg WDL  (L) Leg WDL  (R) Heel/Foot/Toe WDL  (L) Heel/Foot/Toe WDL          Devices In Places none      Interventions In Place Pillows    Possible Skin Injury No    Pictures Uploaded Into Epic N/A  Wound Consult Placed N/A  RN Wound Prevention Protocol Ordered No

## 2023-10-27 NOTE — DISCHARGE INSTRUCTIONS
Diet    Soft Food Diet    A Soft Food Diet includes foods that are safe and easy to swallow. Generally, the food should be soft enough to be mashed with a fork. Take small bites of food, or cut food into pieces about ½ inch or smaller. Avoid foods that are dry, hard to chew, crunchy, sticky, stringy, or crispy.      High Fiber Diet    A High Fiber Diet has many health benefits such as preventing constipation, lowering blood cholesterol, helping with weight loss, and reducing your risk of heart disease, diabetes, and certain cancers . The best sources of fiber include whole fruits and vegetables, whole grains, nuts, seeds, and beans. Look for foods that contain 5 g of fiber or more per serving. Gradually increase your intake of fiber. Increasing too fast can result in cramping, bloating, and gas. Drink plenty of water while you increase your fiber.

## 2023-10-27 NOTE — DISCHARGE SUMMARY
Discharge Summary    CHIEF COMPLAINT ON ADMISSION  Chief Complaint   Patient presents with    Abdominal Pain     Pt states that this morning at 0030 she developed lower abd pain that has been constant since onset. No radiating. She has had about multiple episodes of diarrhea that turned into bright red bloody diarrhea around 0400. Nausea without vomiting.   No blood thinners.   History of diverticulitis.        Reason for Admission  Abdominal Pain     Admission Date  10/26/2023    CODE STATUS  Full Code    HPI & HOSPITAL COURSE  This is a 67 y.o. female with PMHx HTN and diverticulitis here with reported abdominal pains, loose stools and blood per rectum.  Patient has had multiple episodes of diarrhea.     CT scan did show diverticulosis without clear evidence of diverticulitis.  Patient's stool studies showed a positive lactoferrin.  She also had leukocytosis 12.2.  It is unclear if this is infectious in nature or reactive as she did present with dehydration.  Lipase was only 16.    She was evaluated by gastroenterology consultants.  Patient had a colonoscopy on 01/2023 which showed only left-sided diverticulosis.  GI recommended to await for stool studies but can be followed outpatient.  Advance diet to GI soft.  He had signed off on patient's case, no procedures needed.      Therefore, she is discharged in good and stable condition to home with close outpatient follow-up.    The patient met 2-midnight criteria for an inpatient stay at the time of discharge.    Discharge Date  10/27/2023    FOLLOW UP ITEMS POST DISCHARGE  With PCP and [facility]    DISCHARGE DIAGNOSES  Principal Problem:    Colitis (POA: Yes)  Active Problems:    Primary hypertension (POA: Yes)    Prediabetes (POA: Yes)    Dyslipidemia (POA: Yes)    Dehydration (POA: Yes)    Abnormal CT scan of the abdomen concerning for uterine/pelvic mass (POA: Yes)  Resolved Problems:    * No resolved hospital problems. *      FOLLOW UP  Future Appointments   Date  Time Provider Department Center   4/11/2024 10:10 AM Radha Guerrero M.D. [facility]     Radha Guerrero M.D.  [address]  [address]  [place] [id number] [phone]    Schedule an appointment as soon as possible for a visit in 1 week(s)  Please follow up for stool studies results      MEDICATIONS ON DISCHARGE     Medication List        CONTINUE taking these medications        Instructions   acetaminophen 500 MG Tabs  Commonly known as: Tylenol   Take 500-1,000 mg by mouth every 6 hours as needed for Moderate Pain.  Dose: 500-1,000 mg     CALCIUM PO   Take 2 Tablets by mouth every morning.  Dose: 2 Tablet     losartan 100 MG Tabs  Commonly known as: Cozaar   TAKE 1 TABLET BY MOUTH EVERY DAY  Dose: 100 mg     MAGNESIUM PO   Take 1 Tablet by mouth every morning.  Dose: 1 Tablet     multivitamin Tabs   Take 1 Tablet by mouth every morning.  Dose: 1 Tablet     OMEGA 3 PO   Take 1 Capsule by mouth every morning.  Dose: 1 Capsule            STOP taking these medications      BERBERINE HCI PO              Allergies  Allergies   Allergen Reactions    Iodine Hives    Penicillins Hives       DIET  Orders Placed This Encounter   Procedures    Diet Order Diet: Low Fiber(GI Soft)     Standing Status:   Standing     Number of Occurrences:   1     Order Specific Question:   Diet:     Answer:   Low Fiber(GI Soft) [2]       ACTIVITY  As tolerated.  Weight bearing as tolerated    CONSULTATIONS  [facility]    PROCEDURES  none    LABORATORY  Lab Results   Component Value Date    SODIUM 137 10/27/2023    POTASSIUM 4.2 10/27/2023    CHLORIDE 103 10/27/2023    CO2 20 10/27/2023    GLUCOSE 113 (H) 10/27/2023    BUN 13 10/27/2023    CREATININE 0.73 10/27/2023        Lab Results   Component Value Date    WBC 12.2 (H) 10/27/2023    HEMOGLOBIN 13.0 10/27/2023    HEMATOCRIT 37.9 10/27/2023    PLATELETCT 349 10/27/2023        Total time of the discharge process exceeds 33 minutes.  More than 50% of time was spent face to face with patient.  This  included but not limited to review of hospital course with patient, treatment goals upon discharge, recommendations to PCP, continued and new medications and their adverse reactions, and nursing instructions for patient.

## 2023-10-27 NOTE — CARE PLAN
The patient is Stable - Low risk of patient condition declining or worsening    Shift Goals  Clinical Goals: Collect stool sample this shift, continue IV hydration  Patient Goals: Rest, pain mgt with tylenol    Progress made toward(s) clinical / shift goals:      Stool sample collected twice this shift, IV hydration continued. PRN tylenol given 2x, pt seen asleep with calm, unlabored breathing post interventions.    Patient is not progressing towards the following goals:    Pts Bms have been small.  Per lab stool sample sent twice is still not enough, only 2 out of 4 tests will be run from stool sample collected.   We will need to collect additional stool sample.   This RN will relay to Day RN for additional sample to be collected.

## 2023-10-27 NOTE — DISCHARGE PLANNING
HTH/ [facility] TCN chart review completed. Due to low LACE 23 and high 6 click scores noted at 24 ADL and 24 mobility as well as patients currently documented level of function, no TCN needs anticipated in patient discharge planning at this time. Should post acute discharge needs arise warranting TCN involvement, please contact TCN team via Voalte. Thank you.

## 2023-10-27 NOTE — PROGRESS NOTES
"Received bedside report from day shift LEIDA Murray   at 19:15. Assumed pt care.   Pt seen AO4, on room air. IVF LR infusing at 100/hr. Abdominal pain reported, pain meds offered, per pt she will \"Wait for tylenol to be available\".   No nausea reported at this time.   POC and goals discussed. Whiteboard updated.   Safety and fall precautions in place.   Bed locked and lowest position.  Instructed pt to use call light, verbalized understanding.   Call light kept within reach.  No other needs reported at this time.   Implementation of POC in progress.   "

## 2023-10-27 NOTE — PROGRESS NOTES
Gastroenterology Progress Note    Date of Service  10/27/2023    Referring Physician  Pato Boland M.D.    GI Provider  JAYLAN Martinez    Reason for Consultation  Bloody diarrhea    History of Presenting Illness  67 y.o. female with HTN who presented 10/26/2023 with abdominal pain and bloody diarrhea.    She awoke last night at 12:30 AM with acute onset diaphoresis, crampy mid-abdominal pain followed by diarrhea and then multiple episodes of bloody diarrhea.  Last episodes around noon today.  Pain has improved some.  No fevers or chills.  No prior episodes but has had diverticulitis in past which feels different.  Had shrimp omellette last night but no one else sick around her and  had same food.    Last colonoscopy 1/2023 with left sided diverticulosis.    Interval History  10/27/2023: Patient is stable and doing well. She is nearly pain free but does get intestinal cramping when she has oral intake. No bleeding since yesterday. Hgb is stable. No fevers, chills.       Review of Systems  Review of Systems   Constitutional:  Negative for chills.   Respiratory: Negative.     Cardiovascular: Negative.    Gastrointestinal:  Negative for abdominal pain, blood in stool, diarrhea and nausea.   Musculoskeletal: Negative.    All other systems reviewed and are negative.      Past Medical History   has a past medical history of Chronic paroxysmal hemicrania, not intractable (3/11/2022), Dyslipidemia (10/10/2023), Essential hypertension (3/11/2022), H/O sarcoidosis (3/11/2022), Osteopenia of lumbar spine (3/2/2023), Prediabetes (6/7/2022), Sigmoid diverticulitis (9/20/2022), Visual changes (3/11/2022), and Vitamin D deficiency (3/11/2022).    Surgical History   has no past surgical history on file.    Family History  family history is not on file.    Social History   reports that she has never smoked. She has never used smokeless tobacco. She reports current alcohol use. She reports that she does not use  drugs.    Medications    Current Facility-Administered Medications:     losartan    acetaminophen    senna-docusate **AND** polyethylene glycol/lytes **AND** magnesium hydroxide **AND** bisacodyl    LR    Notify provider if pain remains uncontrolled **AND** Use the Numeric Rating Scale (NRS), Clark-Baker Faces (WBF), or FLACC on regular floors and Critical-Care Pain Observation Tool (CPOT) on ICUs/Trauma to assess pain **AND** Pulse Ox **AND** Pharmacy Consult Request **AND** If patient difficult to arouse and/or has respiratory depression (respiratory rate of 10 or less), stop any opiates that are currently infusing and call a Rapid Response.    oxyCODONE immediate-release **OR** oxyCODONE immediate-release **OR** HYDROmorphone    ondansetron    ondansetron    Medications Prior to Admission   Medication Sig Dispense Refill Last Dose    Berberine Chloride (BERBERINE HCI PO) Take 1 Tablet by mouth 2 times a day.   10/25/2023 at AM    acetaminophen (TYLENOL) 500 MG Tab Take 500-1,000 mg by mouth every 6 hours as needed for Moderate Pain.   10/25/2023 at PRN    MAGNESIUM PO Take 1 Tablet by mouth every morning.   10/25/2023 at AM    losartan (COZAAR) 100 MG Tab TAKE 1 TABLET BY MOUTH EVERY  Tablet 3 10/26/2023 at 0600    multivitamin (THERAGRAN) Tab Take 1 Tablet by mouth every morning.   10/25/2023 at AM    Omega-3 Fatty Acids (OMEGA 3 PO) Take 1 Capsule by mouth every morning.   10/25/2023 at AM    CALCIUM PO Take 2 Tablets by mouth every morning.   10/25/2023 at AM         Allergies  Allergies   Allergen Reactions    Iodine Hives    Penicillins Hives       Physical Exam  Temp:  [36.1 °C (97 °F)-36.9 °C (98.5 °F)] 36.7 °C (98.1 °F)  Pulse:  [78-95] 78  Resp:  [16-18] 18  BP: (114-156)/() 133/84  SpO2:  [94 %-100 %] 98 %    Physical Exam  Vitals and nursing note reviewed.   Constitutional:       Appearance: Normal appearance.   HENT:      Head: Normocephalic and atraumatic.      Right Ear: External ear  normal.      Left Ear: External ear normal.      Mouth/Throat:      Mouth: Mucous membranes are moist.      Pharynx: Oropharynx is clear.   Eyes:      Extraocular Movements: Extraocular movements intact.      Conjunctiva/sclera: Conjunctivae normal.      Pupils: Pupils are equal, round, and reactive to light.   Cardiovascular:      Rate and Rhythm: Normal rate and regular rhythm.      Pulses: Normal pulses.      Heart sounds: Normal heart sounds.   Pulmonary:      Effort: Pulmonary effort is normal. No respiratory distress.      Breath sounds: Normal breath sounds. No wheezing.   Abdominal:      General: Abdomen is flat. Bowel sounds are normal. There is no distension.      Palpations: Abdomen is soft. There is no mass.      Tenderness: There is no abdominal tenderness. There is no guarding or rebound.      Hernia: No hernia is present.   Musculoskeletal:      Right lower leg: No edema.      Left lower leg: No edema.   Skin:     General: Skin is warm and dry.   Neurological:      General: No focal deficit present.      Mental Status: She is alert and oriented to person, place, and time.   Psychiatric:         Mood and Affect: Mood normal.         Behavior: Behavior normal.         Fluids      Laboratory  Recent Labs     10/26/23  0950 10/27/23  0115   WBC 12.4* 12.2*   RBC 4.46 4.25   HEMOGLOBIN 13.6 13.0   HEMATOCRIT 40.5 37.9   MCV 90.8 89.2   MCH 30.5 30.6   MCHC 33.6 34.3   RDW 42.6 43.1   PLATELETCT 332 349   MPV 9.0 9.7       Recent Labs     10/26/23  0950 10/27/23  0115   SODIUM 135 137   POTASSIUM 4.0 4.2   CHLORIDE 99 103   CO2 22 20   GLUCOSE 139* 113*   BUN 22 13   CREATININE 0.75 0.73   CALCIUM 9.0 9.1                       Imaging  CT-ABDOMEN-PELVIS WITH   Final Result      Moderate distal diverticulosis of the colon without focal inflammatory change to confirm acute diverticulitis. Early diverticulitis cannot be excluded      Incidental likely leiomyoma exophytic from the right uterine fundus favored  over an adjacent mass. Pelvic ultrasound could likely clarify this and could be performed as an outpatient.      Parapelvic renal and left hepatic cysts are without concerning change            Assessment/Plan  68 y/o with HTN that presented for abdominal pain and bloody diarrhea.  History and exam findings consistent with mild ischemic colitis and less likely infectious colitis.      PROBLEMS:  Abdominal pain, generalized  Bloody diarrhea  Leukocytosis  Diverticulosis  Hypertension    PLAN:  IVF for hydration  Await stool culture, treat if positive. This can be followed up outpatient  Advance to GI soft diet  If patient tolerates diet likely can go home  Dicyclomine as needed with caution for abdominal pain  Outpatient follow up with [facility] in 8-12 weeks    GI will sign off

## 2023-10-28 LAB
E COLI SXT1+2 STL IA: NORMAL
SIGNIFICANT IND 70042: NORMAL
SITE SITE: NORMAL
SOURCE SOURCE: NORMAL

## 2023-10-30 ENCOUNTER — PATIENT OUTREACH (OUTPATIENT)
Dept: MEDICAL GROUP | Facility: PHYSICIAN GROUP | Age: 68
End: 2023-10-30

## 2023-10-30 ENCOUNTER — OFFICE VISIT (OUTPATIENT)
Dept: MEDICAL GROUP | Facility: PHYSICIAN GROUP | Age: 68
End: 2023-10-30
Payer: MEDICARE

## 2023-10-30 VITALS
OXYGEN SATURATION: 98 % | SYSTOLIC BLOOD PRESSURE: 130 MMHG | BODY MASS INDEX: 21.79 KG/M2 | WEIGHT: 147.1 LBS | TEMPERATURE: 99 F | DIASTOLIC BLOOD PRESSURE: 72 MMHG | HEIGHT: 69 IN | HEART RATE: 82 BPM

## 2023-10-30 DIAGNOSIS — K52.9 COLITIS: ICD-10-CM

## 2023-10-30 DIAGNOSIS — D25.0 SUBMUCOUS LEIOMYOMA OF UTERUS: ICD-10-CM

## 2023-10-30 LAB
BACTERIA STL CULT: NORMAL
E COLI SXT1+2 STL IA: NORMAL
SIGNIFICANT IND 70042: NORMAL
SITE SITE: NORMAL
SOURCE SOURCE: NORMAL

## 2023-10-30 PROCEDURE — 3075F SYST BP GE 130 - 139MM HG: CPT | Performed by: INTERNAL MEDICINE

## 2023-10-30 PROCEDURE — 99495 TRANSJ CARE MGMT MOD F2F 14D: CPT | Performed by: INTERNAL MEDICINE

## 2023-10-30 PROCEDURE — 3078F DIAST BP <80 MM HG: CPT | Performed by: INTERNAL MEDICINE

## 2023-10-30 ASSESSMENT — FIBROSIS 4 INDEX: FIB4 SCORE: 0.76

## 2023-10-31 NOTE — PROGRESS NOTES
Subjective:     Yaz Naylor is a 67 y.o. female who presents for Hospital Follow-up.    HPI:   Recently hospitalized for colitis.    1. Colitis  Patient with PMH of diverticulitis presented to ER 10/26/23 with abdominal pain, bloody diarrhea. CT scan did show diverticulosis without clear evidence of diverticulitis.  Patient's stool studies showed a positive lactoferrin.  She also had leukocytosis 12.2.  Lipase 16. No antibiotics indicated.   CY abdomen showed diverticulosis but no definitive diverticulitis.Stool test negative.  Given IVF, advanced diet and discharged. GI consulted.     Patient doing well since DC. Denies f/c, abdominal pain, n/v/d. Patient eating bland food. F/u with GI recommended in 2 months.    2. Submucous leiomyoma of uterus  CT abdomen showed incidental finding of a leiomyoma in uterine fundus.   - US-PELVIC TRANSVAGINAL ONLY; Future      Current medicines (including reconciliation performed today)  Current Outpatient Medications   Medication Sig Dispense Refill    acetaminophen (TYLENOL) 500 MG Tab Take 500-1,000 mg by mouth every 6 hours as needed for Moderate Pain.      MAGNESIUM PO Take 1 Tablet by mouth every morning.      losartan (COZAAR) 100 MG Tab TAKE 1 TABLET BY MOUTH EVERY  Tablet 3    multivitamin (THERAGRAN) Tab Take 1 Tablet by mouth every morning.      Omega-3 Fatty Acids (OMEGA 3 PO) Take 1 Capsule by mouth every morning.      CALCIUM PO Take 2 Tablets by mouth every morning.       No current facility-administered medications for this visit.       Allergies:   Iodine and Penicillins    Social History     Tobacco Use    Smoking status: Never    Smokeless tobacco: Never   Vaping Use    Vaping Use: Never used   Substance Use Topics    Alcohol use: Yes     Comment: OCC    Drug use: Never       ROS:  Denied cacrdipulmonary, GI or neurologic symptoms.    Objective:     Vitals:    10/30/23 1644   BP: 130/72   BP Location: Right arm   Patient Position: Sitting   Pulse: 82  "  Temp: 37.2 °C (99 °F)   TempSrc: Temporal   SpO2: 98%   Weight: 66.7 kg (147 lb 1.6 oz)   Height: 1.753 m (5' 9\")     Body mass index is 21.72 kg/m².    Physical Exam:  Constitutional: Alert, no distress, well-groomed.  Skin: Warm, dry, good turgor, no rashes in visible areas.  Eye: Equal, round and reactive, conjunctiva clear, lids normal.  ENMT: Lips without lesions, good dentition, moist mucous membranes.  Neck: Trachea midline, no masses, no thyromegaly.  Respiratory: Unlabored respiratory effort, no cough.  Abdomen: Soft, no gross masses.  MSK: Normal gait, moves all extremities.  Neuro: Grossly non-focal. No cranial nerve deficit. Strength and sensation intact.   Psych: Alert and oriented x3, normal affect and mood.      Assessment and Plan:   1. Colitis  Stable. F/u with GI.  Advance diet.    2. Submucous leiomyoma of uterus  - US-PELVIC TRANSVAGINAL ONLY; Future      - Chart and discharge summary were reviewed.   - Hospitalization and results reviewed with patient.   - Medications reviewed including instructions regarding high risk medications, dosing and side effects.  - Recommended Services: No services needed at this time  - Advance directive/POLST on file?  Yes    Follow-up:No follow-ups on file.    Face-to-face transitional care management services with [facility] (today's visit is within 14 days post discharge & LACE+ score of 28-58) medical decision complexity were provided.     LACE+ Historical Score Over Time (0-28: Low, 29-58: Medium, 59+: High): 23      Critical Care                  "

## 2023-11-01 ENCOUNTER — TELEPHONE (OUTPATIENT)
Dept: HEALTH INFORMATION MANAGEMENT | Facility: OTHER | Age: 68
End: 2023-11-01

## 2023-11-03 ENCOUNTER — APPOINTMENT (OUTPATIENT)
Dept: RADIOLOGY | Facility: MEDICAL CENTER | Age: 68
End: 2023-11-03
Attending: INTERNAL MEDICINE
Payer: MEDICARE

## 2023-11-27 ENCOUNTER — APPOINTMENT (OUTPATIENT)
Dept: RADIOLOGY | Facility: MEDICAL CENTER | Age: 68
End: 2023-11-27
Attending: INTERNAL MEDICINE
Payer: MEDICARE

## 2023-11-27 DIAGNOSIS — D25.0 SUBMUCOUS LEIOMYOMA OF UTERUS: ICD-10-CM

## 2023-11-27 PROCEDURE — 76830 TRANSVAGINAL US NON-OB: CPT

## 2023-12-22 RX ORDER — LOSARTAN POTASSIUM 100 MG/1
100 TABLET ORAL
Qty: 100 TABLET | Refills: 3 | Status: SHIPPED | OUTPATIENT
Start: 2023-12-22

## 2023-12-22 NOTE — TELEPHONE ENCOUNTER
Received request via: Pharmacy    Was the patient seen in the last year in this department? Yes  10/30/2023  Does the patient have an active prescription (recently filled or refills available) for medication(s) requested? No    Does the patient have [facility] and need 100 day supply (blood pressure, diabetes and cholesterol meds only)? Yes, quantity updated to 100 days

## 2024-04-11 ENCOUNTER — APPOINTMENT (OUTPATIENT)
Dept: MEDICAL GROUP | Facility: PHYSICIAN GROUP | Age: 69
End: 2024-04-11
Payer: MEDICARE

## 2024-04-11 VITALS
HEART RATE: 68 BPM | DIASTOLIC BLOOD PRESSURE: 78 MMHG | BODY MASS INDEX: 21.82 KG/M2 | HEIGHT: 69 IN | TEMPERATURE: 97.6 F | RESPIRATION RATE: 16 BRPM | OXYGEN SATURATION: 98 % | WEIGHT: 147.3 LBS | SYSTOLIC BLOOD PRESSURE: 124 MMHG

## 2024-04-11 DIAGNOSIS — R73.03 PREDIABETES: ICD-10-CM

## 2024-04-11 DIAGNOSIS — I10 PRIMARY HYPERTENSION: ICD-10-CM

## 2024-04-11 DIAGNOSIS — D25.0 SUBMUCOUS LEIOMYOMA OF UTERUS: ICD-10-CM

## 2024-04-11 LAB
HBA1C MFR BLD: 6.1 % (ref ?–5.8)
POCT INT CON NEG: NEGATIVE
POCT INT CON POS: POSITIVE

## 2024-04-11 PROCEDURE — 83036 HEMOGLOBIN GLYCOSYLATED A1C: CPT | Performed by: INTERNAL MEDICINE

## 2024-04-11 PROCEDURE — 3078F DIAST BP <80 MM HG: CPT | Performed by: INTERNAL MEDICINE

## 2024-04-11 PROCEDURE — 99214 OFFICE O/P EST MOD 30 MIN: CPT | Performed by: INTERNAL MEDICINE

## 2024-04-11 PROCEDURE — 3074F SYST BP LT 130 MM HG: CPT | Performed by: INTERNAL MEDICINE

## 2024-04-11 ASSESSMENT — PATIENT HEALTH QUESTIONNAIRE - PHQ9: CLINICAL INTERPRETATION OF PHQ2 SCORE: 0

## 2024-04-11 ASSESSMENT — FIBROSIS 4 INDEX: FIB4 SCORE: 0.77

## 2024-04-11 NOTE — PROGRESS NOTES
CC: f/u prediabetes, pelvic US    HPI:  Yaz presents with the following    1. Prediabetes  6/8/2023:Hemoglobin A1c 6.5, up from 6.0 last year.  Positive family history of diabetes in her mom.  No personal PMH of diabetes.  After seeing her lab results, discontinued ice cream.  Patient is walking at least 20 to 30 minutes a day.  No diabetic medications at this time.     10/10/2023:.  Hemoglobin A1c 6.4, slightly down from 6.5.  Patient started using Metamucil.  Continues to eat a nutritious diet.    4/11/24: A1c is 6.1. Patient eating more salads, veges, fiber. Restarting her walking with better weather.       2. Submucous leiomyoma of uterus  CT abdomen showed incidental finding of a leiomyoma in uterine fundus. Pelvic US showed 1.8 cm uterine fibroid. Patient denied pain/discomfort, vaginal bleeding.     3. Primary hypertension  Blood pressure has been stable with losratan 100 mg daily.       Patient Active Problem List    Diagnosis Date Noted    Submucous leiomyoma of uterus 10/30/2023    Colitis 10/26/2023    Dehydration 10/26/2023    Abnormal CT scan of the abdomen concerning for uterine/pelvic mass 10/26/2023    Dyslipidemia 10/10/2023    Osteopenia of lumbar spine 03/02/2023    Diverticulitis of colon 09/06/2022    Prediabetes 06/07/2022    Primary hypertension 03/11/2022    Chronic paroxysmal hemicrania, not intractable 03/11/2022    H/O sarcoidosis 03/11/2022    Vitamin D deficiency 03/11/2022    Visual changes 03/11/2022       Current Outpatient Medications   Medication Sig Dispense Refill    losartan (COZAAR) 100 MG Tab TAKE 1 TABLET BY MOUTH EVERY  Tablet 3    acetaminophen (TYLENOL) 500 MG Tab Take 500-1,000 mg by mouth every 6 hours as needed for Moderate Pain.      MAGNESIUM PO Take 1 Tablet by mouth every morning.      multivitamin (THERAGRAN) Tab Take 1 Tablet by mouth every morning.      Omega-3 Fatty Acids (OMEGA 3 PO) Take 1 Capsule by mouth every morning.      CALCIUM PO Take 2  "Tablets by mouth every morning.       No current facility-administered medications for this visit.         Allergies as of 04/11/2024 - Reviewed 04/11/2024   Allergen Reaction Noted    Iodine Hives 03/11/2022    Penicillins Hives 03/11/2022        Social History     Socioeconomic History    Marital status:      Spouse name: Not on file    Number of children: Not on file    Years of education: Not on file    Highest education level: Not on file   Occupational History    Not on file   Tobacco Use    Smoking status: Never    Smokeless tobacco: Never   Vaping Use    Vaping Use: Never used   Substance and Sexual Activity    Alcohol use: Yes     Comment: OCC    Drug use: Never    Sexual activity: Not on file   Other Topics Concern    Not on file   Social History Narrative    Not on file     Social Determinants of Health     Financial Resource Strain: Not on file   Food Insecurity: Not on file   Transportation Needs: Not on file   Physical Activity: Not on file   Stress: Not on file   Social Connections: Not on file   Intimate Partner Violence: Not on file   Housing Stability: Not on file       History reviewed. No pertinent family history.    History reviewed. No pertinent surgical history.    ROS:  Denies any Headache,Chest pain,  Shortness of breath,  Abdominal pain, Changes of bowel or bladder, Lower ext edema, Fevers, Nights sweats, Weight Changes, Focal weakness or numbness.  All other systems are negative.    /78 (BP Location: Left arm, Patient Position: Sitting)   Pulse 68   Temp 36.4 °C (97.6 °F) (Temporal)   Resp 16   Ht 1.753 m (5' 9\")   Wt 66.8 kg (147 lb 4.8 oz)   SpO2 98%   BMI 21.75 kg/m²      Physical Exam:  Gen:         Alert and oriented, No apparent distress.  HEENT:   Perrla, TM clear,  Oralpharynx no erythema or exudates.  Neck:       No Jugular venous distension, Lymphadenopathy, Thyromegaly, Bruits.  Lungs:     Clear to auscultation bilaterally, no wheezing, rhonchi or " crackles  CV:          Regular rate and rhythm. No murmurs, rubs or gallops.  Abd:         Soft non tender, non distended. Normal active bowel sounds.  No masses noted.            Ext:          No clubbing, cyanosis, edema.      Assessment and Plan.   68 y.o. female presenting with the following.     1. Prediabetes  Continue diet and lifestyle changes.  - POCT Hemoglobin A1C    2. Submucous leiomyoma of uterus  Stable. Monitor.     3. Primary hypertension   Continue losartan 100 mg daily.

## 2024-07-18 ENCOUNTER — APPOINTMENT (RX ONLY)
Dept: URBAN - METROPOLITAN AREA CLINIC 4 | Facility: CLINIC | Age: 69
Setting detail: DERMATOLOGY
End: 2024-07-18

## 2024-07-18 DIAGNOSIS — L73.8 OTHER SPECIFIED FOLLICULAR DISORDERS: ICD-10-CM

## 2024-07-18 DIAGNOSIS — L82.1 OTHER SEBORRHEIC KERATOSIS: ICD-10-CM

## 2024-07-18 PROCEDURE — 99212 OFFICE O/P EST SF 10 MIN: CPT

## 2024-07-18 PROCEDURE — ? LIQUID NITROGEN

## 2024-07-18 PROCEDURE — ? COUNSELING

## 2024-07-18 PROCEDURE — ? ADDITIONAL NOTES

## 2024-07-18 ASSESSMENT — LOCATION DETAILED DESCRIPTION DERM
LOCATION DETAILED: LEFT LATERAL SUPERIOR CHEST
LOCATION DETAILED: LEFT DISTAL DORSAL FOREARM
LOCATION DETAILED: LEFT MEDIAL MALAR CHEEK
LOCATION DETAILED: RIGHT RADIAL DORSAL HAND
LOCATION DETAILED: LEFT MEDIAL UPPER BACK
LOCATION DETAILED: LEFT MEDIAL SUPERIOR CHEST

## 2024-07-18 ASSESSMENT — LOCATION ZONE DERM
LOCATION ZONE: HAND
LOCATION ZONE: TRUNK
LOCATION ZONE: FACE
LOCATION ZONE: ARM

## 2024-07-18 ASSESSMENT — LOCATION SIMPLE DESCRIPTION DERM
LOCATION SIMPLE: LEFT CHEEK
LOCATION SIMPLE: LEFT UPPER BACK
LOCATION SIMPLE: LEFT FOREARM
LOCATION SIMPLE: CHEST
LOCATION SIMPLE: RIGHT HAND

## 2024-07-18 NOTE — PROCEDURE: LIQUID NITROGEN
Add 52 Modifier (Optional): no
Detail Level: Detailed
Medical Necessity Information: It is in your best interest to select a reason for this procedure from the list below. All of these items fulfill various CMS LCD requirements except the new and changing color options.
Duration Of Freeze Thaw-Cycle (Seconds): 0
Medical Necessity Clause: This procedure was medically necessary because the lesions that were treated were:
Post-Care Instructions: I reviewed with the patient in detail post-care instructions. Patient is to wear sunprotection, and avoid picking at any of the treated lesions. Pt may apply Vaseline to crusted or scabbing areas.
Show Spray Paint Technique Variable?: Yes
Spray Paint Text: The liquid nitrogen was applied to the skin utilizing a spray paint frosting technique.
Consent: The patient's consent was obtained including but not limited to risks of crusting, scabbing, blistering, scarring, darker or lighter pigmentary change, recurrence, incomplete removal and infection.

## 2024-07-18 NOTE — PROCEDURE: ADDITIONAL NOTES
Render Risk Assessment In Note?: no
Additional Notes: Patient was told these are benign can be frozen off but more than likely will come back
Detail Level: Simple
[Recent Weight Gain (___ Lbs)] : recent weight gain: [unfilled] lbs
[Dysphagia] : dysphagia
[Recent Weight Loss (___ Lbs)] : no recent weight loss
[Neck Pain] : no neck pain
[Dysphonia] : no dysphonia
[Chest Pain] : no chest pain
[Palpitations] : no palpitations
[Shortness Of Breath] : no shortness of breath
[Constipation] : no constipation
[Diarrhea] : no diarrhea
[Tremors] : no tremors
[Depression] : no depression
[Anxiety] : no anxiety
[FreeTextEntry4] : dry throat in the morning

## 2024-07-24 ENCOUNTER — APPOINTMENT (OUTPATIENT)
Dept: MEDICAL GROUP | Facility: PHYSICIAN GROUP | Age: 69
End: 2024-07-24
Payer: MEDICARE

## 2024-07-24 VITALS
OXYGEN SATURATION: 95 % | TEMPERATURE: 97.7 F | RESPIRATION RATE: 14 BRPM | WEIGHT: 142.3 LBS | HEIGHT: 69 IN | BODY MASS INDEX: 21.08 KG/M2 | SYSTOLIC BLOOD PRESSURE: 118 MMHG | DIASTOLIC BLOOD PRESSURE: 76 MMHG | HEART RATE: 89 BPM

## 2024-07-24 DIAGNOSIS — Z00.00 ENCOUNTER FOR ANNUAL WELLNESS VISIT (AWV) IN MEDICARE PATIENT: ICD-10-CM

## 2024-07-24 DIAGNOSIS — G44.049 CHRONIC PAROXYSMAL HEMICRANIA, NOT INTRACTABLE: ICD-10-CM

## 2024-07-24 DIAGNOSIS — K57.90 DIVERTICULOSIS: ICD-10-CM

## 2024-07-24 DIAGNOSIS — Z12.31 ENCOUNTER FOR SCREENING MAMMOGRAM FOR MALIGNANT NEOPLASM OF BREAST: ICD-10-CM

## 2024-07-24 DIAGNOSIS — Z13.220 SCREENING CHOLESTEROL LEVEL: ICD-10-CM

## 2024-07-24 DIAGNOSIS — D25.0 SUBMUCOUS LEIOMYOMA OF UTERUS: ICD-10-CM

## 2024-07-24 DIAGNOSIS — E55.9 VITAMIN D DEFICIENCY: ICD-10-CM

## 2024-07-24 DIAGNOSIS — M85.88 OSTEOPENIA OF LUMBAR SPINE: ICD-10-CM

## 2024-07-24 DIAGNOSIS — R73.03 PREDIABETES: ICD-10-CM

## 2024-07-24 DIAGNOSIS — I10 PRIMARY HYPERTENSION: ICD-10-CM

## 2024-07-24 PROBLEM — E86.0 DEHYDRATION: Status: RESOLVED | Noted: 2023-10-26 | Resolved: 2024-07-24

## 2024-07-24 PROBLEM — K57.32 DIVERTICULITIS OF COLON: Status: RESOLVED | Noted: 2022-09-06 | Resolved: 2024-07-24

## 2024-07-24 LAB
HBA1C MFR BLD: 6 % (ref ?–5.8)
POCT INT CON NEG: NEGATIVE
POCT INT CON POS: POSITIVE

## 2024-07-24 PROCEDURE — 3074F SYST BP LT 130 MM HG: CPT | Performed by: INTERNAL MEDICINE

## 2024-07-24 PROCEDURE — 83036 HEMOGLOBIN GLYCOSYLATED A1C: CPT | Performed by: INTERNAL MEDICINE

## 2024-07-24 PROCEDURE — G0439 PPPS, SUBSEQ VISIT: HCPCS | Performed by: INTERNAL MEDICINE

## 2024-07-24 PROCEDURE — 3078F DIAST BP <80 MM HG: CPT | Performed by: INTERNAL MEDICINE

## 2024-07-24 ASSESSMENT — PATIENT HEALTH QUESTIONNAIRE - PHQ9: CLINICAL INTERPRETATION OF PHQ2 SCORE: 0

## 2024-07-24 ASSESSMENT — FIBROSIS 4 INDEX: FIB4 SCORE: 0.77

## 2024-07-24 ASSESSMENT — ENCOUNTER SYMPTOMS: GENERAL WELL-BEING: GOOD

## 2024-07-24 ASSESSMENT — ACTIVITIES OF DAILY LIVING (ADL): BATHING_REQUIRES_ASSISTANCE: 0

## 2024-08-23 ENCOUNTER — TELEPHONE (OUTPATIENT)
Dept: HEALTH INFORMATION MANAGEMENT | Facility: OTHER | Age: 69
End: 2024-08-23
Payer: MEDICARE

## 2024-08-26 ENCOUNTER — PATIENT MESSAGE (OUTPATIENT)
Dept: HEALTH INFORMATION MANAGEMENT | Facility: OTHER | Age: 69
End: 2024-08-26

## 2024-09-25 ENCOUNTER — APPOINTMENT (OUTPATIENT)
Dept: RADIOLOGY | Facility: MEDICAL CENTER | Age: 69
End: 2024-09-25
Attending: INTERNAL MEDICINE
Payer: MEDICARE

## 2024-09-25 DIAGNOSIS — Z12.31 ENCOUNTER FOR SCREENING MAMMOGRAM FOR MALIGNANT NEOPLASM OF BREAST: ICD-10-CM

## 2024-09-25 PROCEDURE — 77067 SCR MAMMO BI INCL CAD: CPT

## 2024-10-23 ENCOUNTER — HOSPITAL ENCOUNTER (OUTPATIENT)
Dept: LAB | Facility: MEDICAL CENTER | Age: 69
End: 2024-10-23
Attending: INTERNAL MEDICINE
Payer: MEDICARE

## 2024-10-23 DIAGNOSIS — Z13.220 SCREENING CHOLESTEROL LEVEL: ICD-10-CM

## 2024-10-23 DIAGNOSIS — R73.03 PREDIABETES: ICD-10-CM

## 2024-10-23 DIAGNOSIS — E55.9 VITAMIN D DEFICIENCY: ICD-10-CM

## 2024-10-23 LAB
25(OH)D3 SERPL-MCNC: 55 NG/ML (ref 30–100)
ALBUMIN SERPL BCP-MCNC: 4.6 G/DL (ref 3.2–4.9)
ALBUMIN/GLOB SERPL: 1.7 G/DL
ALP SERPL-CCNC: 79 U/L (ref 30–99)
ALT SERPL-CCNC: 24 U/L (ref 2–50)
ANION GAP SERPL CALC-SCNC: 12 MMOL/L (ref 7–16)
AST SERPL-CCNC: 27 U/L (ref 12–45)
BASOPHILS # BLD AUTO: 1.1 % (ref 0–1.8)
BASOPHILS # BLD: 0.06 K/UL (ref 0–0.12)
BILIRUB SERPL-MCNC: 0.5 MG/DL (ref 0.1–1.5)
BUN SERPL-MCNC: 17 MG/DL (ref 8–22)
CALCIUM ALBUM COR SERPL-MCNC: 9.2 MG/DL (ref 8.5–10.5)
CALCIUM SERPL-MCNC: 9.7 MG/DL (ref 8.5–10.5)
CHLORIDE SERPL-SCNC: 102 MMOL/L (ref 96–112)
CHOLEST SERPL-MCNC: 204 MG/DL (ref 100–199)
CO2 SERPL-SCNC: 24 MMOL/L (ref 20–33)
CREAT SERPL-MCNC: 0.83 MG/DL (ref 0.5–1.4)
EOSINOPHIL # BLD AUTO: 0.12 K/UL (ref 0–0.51)
EOSINOPHIL NFR BLD: 2.2 % (ref 0–6.9)
ERYTHROCYTE [DISTWIDTH] IN BLOOD BY AUTOMATED COUNT: 43.3 FL (ref 35.9–50)
GFR SERPLBLD CREATININE-BSD FMLA CKD-EPI: 77 ML/MIN/1.73 M 2
GLOBULIN SER CALC-MCNC: 2.7 G/DL (ref 1.9–3.5)
GLUCOSE SERPL-MCNC: 109 MG/DL (ref 65–99)
HCT VFR BLD AUTO: 43.7 % (ref 37–47)
HDLC SERPL-MCNC: 75 MG/DL
HGB BLD-MCNC: 14.8 G/DL (ref 12–16)
IMM GRANULOCYTES # BLD AUTO: 0.01 K/UL (ref 0–0.11)
IMM GRANULOCYTES NFR BLD AUTO: 0.2 % (ref 0–0.9)
LDLC SERPL CALC-MCNC: 109 MG/DL
LYMPHOCYTES # BLD AUTO: 1.62 K/UL (ref 1–4.8)
LYMPHOCYTES NFR BLD: 30.3 % (ref 22–41)
MCH RBC QN AUTO: 31.2 PG (ref 27–33)
MCHC RBC AUTO-ENTMCNC: 33.9 G/DL (ref 32.2–35.5)
MCV RBC AUTO: 92.2 FL (ref 81.4–97.8)
MONOCYTES # BLD AUTO: 0.4 K/UL (ref 0–0.85)
MONOCYTES NFR BLD AUTO: 7.5 % (ref 0–13.4)
NEUTROPHILS # BLD AUTO: 3.13 K/UL (ref 1.82–7.42)
NEUTROPHILS NFR BLD: 58.7 % (ref 44–72)
NRBC # BLD AUTO: 0 K/UL
NRBC BLD-RTO: 0 /100 WBC (ref 0–0.2)
PLATELET # BLD AUTO: 243 K/UL (ref 164–446)
PMV BLD AUTO: 10.4 FL (ref 9–12.9)
POTASSIUM SERPL-SCNC: 3.9 MMOL/L (ref 3.6–5.5)
PROT SERPL-MCNC: 7.3 G/DL (ref 6–8.2)
RBC # BLD AUTO: 4.74 M/UL (ref 4.2–5.4)
SODIUM SERPL-SCNC: 138 MMOL/L (ref 135–145)
TRIGL SERPL-MCNC: 100 MG/DL (ref 0–149)
TSH SERPL DL<=0.005 MIU/L-ACNC: 1.08 UIU/ML (ref 0.38–5.33)
WBC # BLD AUTO: 5.3 K/UL (ref 4.8–10.8)

## 2024-10-23 PROCEDURE — 80053 COMPREHEN METABOLIC PANEL: CPT

## 2024-10-23 PROCEDURE — 36415 COLL VENOUS BLD VENIPUNCTURE: CPT

## 2024-10-23 PROCEDURE — 84443 ASSAY THYROID STIM HORMONE: CPT

## 2024-10-23 PROCEDURE — 80061 LIPID PANEL: CPT

## 2024-10-23 PROCEDURE — 82306 VITAMIN D 25 HYDROXY: CPT

## 2024-10-23 PROCEDURE — 85025 COMPLETE CBC W/AUTO DIFF WBC: CPT

## 2024-11-05 ENCOUNTER — APPOINTMENT (OUTPATIENT)
Dept: MEDICAL GROUP | Facility: PHYSICIAN GROUP | Age: 69
End: 2024-11-05
Payer: MEDICARE

## 2024-11-05 VITALS
DIASTOLIC BLOOD PRESSURE: 78 MMHG | HEART RATE: 80 BPM | OXYGEN SATURATION: 99 % | BODY MASS INDEX: 21.42 KG/M2 | SYSTOLIC BLOOD PRESSURE: 122 MMHG | RESPIRATION RATE: 14 BRPM | HEIGHT: 69 IN | TEMPERATURE: 98.1 F | WEIGHT: 144.6 LBS

## 2024-11-05 DIAGNOSIS — E78.5 DYSLIPIDEMIA: ICD-10-CM

## 2024-11-05 DIAGNOSIS — I10 PRIMARY HYPERTENSION: ICD-10-CM

## 2024-11-05 DIAGNOSIS — R73.03 PREDIABETES: ICD-10-CM

## 2024-11-05 LAB
HBA1C MFR BLD: 6.1 % (ref ?–5.8)
POCT INT CON NEG: NEGATIVE
POCT INT CON POS: POSITIVE

## 2024-11-05 PROCEDURE — 99214 OFFICE O/P EST MOD 30 MIN: CPT | Performed by: INTERNAL MEDICINE

## 2024-11-05 PROCEDURE — 3074F SYST BP LT 130 MM HG: CPT | Performed by: INTERNAL MEDICINE

## 2024-11-05 PROCEDURE — 83036 HEMOGLOBIN GLYCOSYLATED A1C: CPT | Performed by: INTERNAL MEDICINE

## 2024-11-05 PROCEDURE — 3078F DIAST BP <80 MM HG: CPT | Performed by: INTERNAL MEDICINE

## 2024-11-05 ASSESSMENT — FIBROSIS 4 INDEX: FIB4 SCORE: 1.52

## 2024-11-05 NOTE — PROGRESS NOTES
CC: Follow-up lab work.    HPI:  Yaz presents with the following    1. Prediabetes  Follow-up A1c 6.1, slightly above from 6.0.  Patient has been taking her berberine 500 mg daily for the last year.  She is eating a healthy mindful diet.  Has not been exercising regularly however tries to walk every day.  Positive family history of diabetes in her mom and diagnosed in her 80s.  Acute MI in her mom and grandfather.    2. Primary hypertension  Blood pressure well-controlled with losartan 100 mg daily.    3. Dyslipidemia  Patient not on a statin.  Total cholesterol 204, triglycerides 100, HDL 75, .  Patient has been following a healthy low-fat low-cholesterol diet.  Taking fish oil supplements.  Total cholesterol has been as high as 227,  in 2023.  Family history of CAD.      Patient Active Problem List    Diagnosis Date Noted    Diverticulosis 07/24/2024    Submucous leiomyoma of uterus 10/30/2023    Colitis 10/26/2023    Abnormal CT scan of the abdomen concerning for uterine/pelvic mass 10/26/2023    Dyslipidemia 10/10/2023    Osteopenia of lumbar spine 03/02/2023    Prediabetes 06/07/2022    Primary hypertension 03/11/2022    Chronic paroxysmal hemicrania, not intractable 03/11/2022    H/O sarcoidosis 03/11/2022    Vitamin D deficiency 03/11/2022    Visual changes 03/11/2022       Current Outpatient Medications   Medication Sig Dispense Refill    losartan (COZAAR) 100 MG Tab TAKE 1 TABLET BY MOUTH EVERY  Tablet 3    acetaminophen (TYLENOL) 500 MG Tab Take 500-1,000 mg by mouth every 6 hours as needed for Moderate Pain.      MAGNESIUM PO Take 1 Tablet by mouth every morning.      multivitamin (THERAGRAN) Tab Take 1 Tablet by mouth every morning.      Omega-3 Fatty Acids (OMEGA 3 PO) Take 1 Capsule by mouth every morning.      CALCIUM PO Take 2 Tablets by mouth every morning.       No current facility-administered medications for this visit.         Allergies as of 11/05/2024 - Reviewed  "11/05/2024   Allergen Reaction Noted    Iodine Hives 03/11/2022    Penicillins Hives 03/11/2022        Social History     Socioeconomic History    Marital status:      Spouse name: Not on file    Number of children: Not on file    Years of education: Not on file    Highest education level: Not on file   Occupational History    Not on file   Tobacco Use    Smoking status: Never    Smokeless tobacco: Never   Vaping Use    Vaping status: Never Used   Substance and Sexual Activity    Alcohol use: Yes     Comment: OCC    Drug use: Never    Sexual activity: Not on file   Other Topics Concern    Not on file   Social History Narrative    Not on file     Social Drivers of Health     Financial Resource Strain: Not on file   Food Insecurity: Not on file   Transportation Needs: Not on file   Physical Activity: Not on file   Stress: Not on file   Social Connections: Not on file   Intimate Partner Violence: Not on file   Housing Stability: Not on file       History reviewed. No pertinent family history.    History reviewed. No pertinent surgical history.    ROS:  Denies any Headache,Chest pain,  Shortness of breath,  Abdominal pain, Changes of bowel or bladder, Lower ext edema, Fevers, Nights sweats, Weight Changes, Focal weakness or numbness.  All other systems are negative.    /78 (BP Location: Left arm, Patient Position: Sitting, BP Cuff Size: Adult)   Pulse 80   Temp 36.7 °C (98.1 °F) (Temporal)   Resp 14   Ht 1.753 m (5' 9\")   Wt 65.6 kg (144 lb 9.6 oz)   SpO2 99%   BMI 21.35 kg/m²      Constitutional: Alert, no distress, well-groomed.  Skin: Warm, dry, good turgor, no rashes in visible areas.  Eye: Equal, round and reactive, conjunctiva clear, lids normal.  ENMT: Lips without lesions, good dentition, moist mucous membranes.  Neck: Trachea midline, no masses, no thyromegaly.  Respiratory: Unlabored respiratory effort, no cough.  Abdomen: Soft, no gross masses.  MSK: Normal gait, moves all " extremities.  Neuro: Grossly non-focal. No cranial nerve deficit. Strength and sensation intact.   Psych: Alert and oriented x3, normal affect and mood.      Assessment and Plan.   68 y.o. female presenting with the following.     1. Prediabetes  Recommend to continue diet lifestyle modifications.  Add resistance training to her aerobic activity.  Continue berberine, consider 2 tablets daily.  Monitor hemoglobin A1c every 6 months.  - POCT Hemoglobin A1C    2. Primary hypertension  Continue Cozaar 100 mg daily.    3. Dyslipidemia  Continue diet lifestyle modifications.  Monitor lipid panels.  Consider CCS due to family history of CAD.  Patient to consider.

## 2024-12-19 ENCOUNTER — APPOINTMENT (OUTPATIENT)
Dept: ADMISSIONS | Facility: MEDICAL CENTER | Age: 69
End: 2024-12-19
Attending: ORTHOPAEDIC SURGERY
Payer: MEDICARE

## 2024-12-24 ENCOUNTER — PRE-ADMISSION TESTING (OUTPATIENT)
Dept: ADMISSIONS | Facility: MEDICAL CENTER | Age: 69
End: 2024-12-24
Attending: ORTHOPAEDIC SURGERY
Payer: MEDICARE

## 2024-12-24 VITALS — BODY MASS INDEX: 22.55 KG/M2 | HEIGHT: 68 IN | WEIGHT: 148.81 LBS

## 2024-12-24 DIAGNOSIS — Z01.812 PRE-OPERATIVE LABORATORY EXAMINATION: ICD-10-CM

## 2024-12-24 DIAGNOSIS — Z01.810 PRE-OPERATIVE CARDIOVASCULAR EXAMINATION: ICD-10-CM

## 2024-12-24 LAB
ANION GAP SERPL CALC-SCNC: 12 MMOL/L (ref 7–16)
BUN SERPL-MCNC: 21 MG/DL (ref 8–22)
CALCIUM SERPL-MCNC: 9.6 MG/DL (ref 8.4–10.2)
CHLORIDE SERPL-SCNC: 104 MMOL/L (ref 96–112)
CO2 SERPL-SCNC: 24 MMOL/L (ref 20–33)
CREAT SERPL-MCNC: 0.75 MG/DL (ref 0.5–1.4)
EKG IMPRESSION: NORMAL
GFR SERPLBLD CREATININE-BSD FMLA CKD-EPI: 87 ML/MIN/1.73 M 2
GLUCOSE SERPL-MCNC: 126 MG/DL (ref 65–99)
POTASSIUM SERPL-SCNC: 4.2 MMOL/L (ref 3.6–5.5)
SODIUM SERPL-SCNC: 140 MMOL/L (ref 135–145)

## 2024-12-24 PROCEDURE — 80048 BASIC METABOLIC PNL TOTAL CA: CPT

## 2024-12-24 PROCEDURE — 36415 COLL VENOUS BLD VENIPUNCTURE: CPT

## 2024-12-24 PROCEDURE — 93005 ELECTROCARDIOGRAM TRACING: CPT | Mod: TC

## 2024-12-24 PROCEDURE — 93010 ELECTROCARDIOGRAM REPORT: CPT | Performed by: STUDENT IN AN ORGANIZED HEALTH CARE EDUCATION/TRAINING PROGRAM

## 2024-12-24 ASSESSMENT — FIBROSIS 4 INDEX: FIB4 SCORE: 1.54

## 2024-12-24 NOTE — PREADMIT AVS NOTE
Current Medications   Medication Instructions    BERBERINE CHLORIDE PO Stop 7 days before surgery    losartan (COZAAR) 100 MG Tab Stop 24 hours before surgery    acetaminophen (TYLENOL) 500 MG Tab As needed medication, may take if needed, including morning of procedure     MAGNESIUM PO Stop 7 days before surgery    multivitamin (THERAGRAN) Tab Stop 7 days before surgery    Omega-3 Fatty Acids (OMEGA 3 PO) Stop 7 days before surgery    CALCIUM PO Stop 7 days before surgery

## 2024-12-24 NOTE — PREPROCEDURE INSTRUCTIONS
PreAdmit Appointment: Reviewed the Preparing for your procedure handout with patient. Patient instructed per pharmacy guidelines regarding taking, holding or contacting provider for instructions on regularly prescribed medications before surgery. Instructed to take the following medications the day of surgery with a sip of water per pharmacy medication guidelines: Tylenol as needed.    Confirmed with patient where to check in morning of surgery. AVS sent to patient's My Chart.

## 2025-01-13 ENCOUNTER — ANESTHESIA EVENT (OUTPATIENT)
Dept: SURGERY | Facility: MEDICAL CENTER | Age: 70
End: 2025-01-13
Payer: MEDICARE

## 2025-01-13 ENCOUNTER — ANESTHESIA (OUTPATIENT)
Dept: SURGERY | Facility: MEDICAL CENTER | Age: 70
End: 2025-01-13
Payer: MEDICARE

## 2025-01-13 ENCOUNTER — HOSPITAL ENCOUNTER (OUTPATIENT)
Facility: MEDICAL CENTER | Age: 70
End: 2025-01-13
Attending: ORTHOPAEDIC SURGERY | Admitting: ORTHOPAEDIC SURGERY
Payer: MEDICARE

## 2025-01-13 VITALS
RESPIRATION RATE: 17 BRPM | BODY MASS INDEX: 22.19 KG/M2 | OXYGEN SATURATION: 93 % | WEIGHT: 146.39 LBS | DIASTOLIC BLOOD PRESSURE: 74 MMHG | SYSTOLIC BLOOD PRESSURE: 130 MMHG | HEART RATE: 87 BPM | HEIGHT: 68 IN | TEMPERATURE: 98.2 F

## 2025-01-13 PROCEDURE — 160009 HCHG ANES TIME/MIN: Performed by: ORTHOPAEDIC SURGERY

## 2025-01-13 PROCEDURE — 160041 HCHG SURGERY MINUTES - EA ADDL 1 MIN LEVEL 4: Performed by: ORTHOPAEDIC SURGERY

## 2025-01-13 PROCEDURE — 700111 HCHG RX REV CODE 636 W/ 250 OVERRIDE (IP): Mod: JZ | Performed by: ORTHOPAEDIC SURGERY

## 2025-01-13 PROCEDURE — 700101 HCHG RX REV CODE 250: Performed by: ANESTHESIOLOGY

## 2025-01-13 PROCEDURE — 160048 HCHG OR STATISTICAL LEVEL 1-5: Performed by: ORTHOPAEDIC SURGERY

## 2025-01-13 PROCEDURE — 160046 HCHG PACU - 1ST 60 MINS PHASE II: Performed by: ORTHOPAEDIC SURGERY

## 2025-01-13 PROCEDURE — 700111 HCHG RX REV CODE 636 W/ 250 OVERRIDE (IP): Performed by: ANESTHESIOLOGY

## 2025-01-13 PROCEDURE — 160002 HCHG RECOVERY MINUTES (STAT): Performed by: ORTHOPAEDIC SURGERY

## 2025-01-13 PROCEDURE — 700105 HCHG RX REV CODE 258: Performed by: ORTHOPAEDIC SURGERY

## 2025-01-13 PROCEDURE — 160025 RECOVERY II MINUTES (STATS): Performed by: ORTHOPAEDIC SURGERY

## 2025-01-13 PROCEDURE — 160035 HCHG PACU - 1ST 60 MINS PHASE I: Performed by: ORTHOPAEDIC SURGERY

## 2025-01-13 PROCEDURE — 700101 HCHG RX REV CODE 250: Performed by: ORTHOPAEDIC SURGERY

## 2025-01-13 PROCEDURE — 160029 HCHG SURGERY MINUTES - 1ST 30 MINS LEVEL 4: Performed by: ORTHOPAEDIC SURGERY

## 2025-01-13 RX ORDER — MIDAZOLAM HYDROCHLORIDE 1 MG/ML
1 INJECTION INTRAMUSCULAR; INTRAVENOUS
Status: DISCONTINUED | OUTPATIENT
Start: 2025-01-13 | End: 2025-01-13 | Stop reason: HOSPADM

## 2025-01-13 RX ORDER — EPINEPHRINE 1 MG/ML(1)
AMPUL (ML) INJECTION
Status: DISCONTINUED | OUTPATIENT
Start: 2025-01-13 | End: 2025-01-13 | Stop reason: HOSPADM

## 2025-01-13 RX ORDER — TRANEXAMIC ACID 100 MG/ML
INJECTION, SOLUTION INTRAVENOUS PRN
Status: DISCONTINUED | OUTPATIENT
Start: 2025-01-13 | End: 2025-01-13 | Stop reason: SURG

## 2025-01-13 RX ORDER — SODIUM CHLORIDE, SODIUM LACTATE, POTASSIUM CHLORIDE, CALCIUM CHLORIDE 600; 310; 30; 20 MG/100ML; MG/100ML; MG/100ML; MG/100ML
INJECTION, SOLUTION INTRAVENOUS CONTINUOUS
Status: ACTIVE | OUTPATIENT
Start: 2025-01-13 | End: 2025-01-13

## 2025-01-13 RX ORDER — DIPHENHYDRAMINE HYDROCHLORIDE 50 MG/ML
12.5 INJECTION INTRAMUSCULAR; INTRAVENOUS
Status: DISCONTINUED | OUTPATIENT
Start: 2025-01-13 | End: 2025-01-13 | Stop reason: HOSPADM

## 2025-01-13 RX ORDER — HYDROMORPHONE HYDROCHLORIDE 1 MG/ML
1 INJECTION, SOLUTION INTRAMUSCULAR; INTRAVENOUS; SUBCUTANEOUS
Status: DISCONTINUED | OUTPATIENT
Start: 2025-01-13 | End: 2025-01-13 | Stop reason: HOSPADM

## 2025-01-13 RX ORDER — METOPROLOL TARTRATE 1 MG/ML
INJECTION, SOLUTION INTRAVENOUS PRN
Status: DISCONTINUED | OUTPATIENT
Start: 2025-01-13 | End: 2025-01-13 | Stop reason: SURG

## 2025-01-13 RX ORDER — MIDAZOLAM HYDROCHLORIDE 1 MG/ML
INJECTION INTRAMUSCULAR; INTRAVENOUS PRN
Status: DISCONTINUED | OUTPATIENT
Start: 2025-01-13 | End: 2025-01-13 | Stop reason: SURG

## 2025-01-13 RX ORDER — HALOPERIDOL 5 MG/ML
1 INJECTION INTRAMUSCULAR
Status: DISCONTINUED | OUTPATIENT
Start: 2025-01-13 | End: 2025-01-13 | Stop reason: HOSPADM

## 2025-01-13 RX ORDER — ONDANSETRON 2 MG/ML
INJECTION INTRAMUSCULAR; INTRAVENOUS PRN
Status: DISCONTINUED | OUTPATIENT
Start: 2025-01-13 | End: 2025-01-13 | Stop reason: SURG

## 2025-01-13 RX ORDER — LIDOCAINE HYDROCHLORIDE 20 MG/ML
INJECTION, SOLUTION EPIDURAL; INFILTRATION; INTRACAUDAL; PERINEURAL PRN
Status: DISCONTINUED | OUTPATIENT
Start: 2025-01-13 | End: 2025-01-13 | Stop reason: SURG

## 2025-01-13 RX ORDER — CEFAZOLIN SODIUM 1 G/3ML
INJECTION, POWDER, FOR SOLUTION INTRAMUSCULAR; INTRAVENOUS PRN
Status: DISCONTINUED | OUTPATIENT
Start: 2025-01-13 | End: 2025-01-13 | Stop reason: SURG

## 2025-01-13 RX ORDER — MEPERIDINE HYDROCHLORIDE 25 MG/ML
12.5 INJECTION INTRAMUSCULAR; INTRAVENOUS; SUBCUTANEOUS
Status: DISCONTINUED | OUTPATIENT
Start: 2025-01-13 | End: 2025-01-13 | Stop reason: HOSPADM

## 2025-01-13 RX ORDER — ONDANSETRON 2 MG/ML
4 INJECTION INTRAMUSCULAR; INTRAVENOUS
Status: DISCONTINUED | OUTPATIENT
Start: 2025-01-13 | End: 2025-01-13 | Stop reason: HOSPADM

## 2025-01-13 RX ORDER — IPRATROPIUM BROMIDE AND ALBUTEROL SULFATE 2.5; .5 MG/3ML; MG/3ML
3 SOLUTION RESPIRATORY (INHALATION)
Status: DISCONTINUED | OUTPATIENT
Start: 2025-01-13 | End: 2025-01-13 | Stop reason: HOSPADM

## 2025-01-13 RX ORDER — OXYCODONE HCL 5 MG/5 ML
5 SOLUTION, ORAL ORAL
Status: DISCONTINUED | OUTPATIENT
Start: 2025-01-13 | End: 2025-01-13 | Stop reason: HOSPADM

## 2025-01-13 RX ORDER — HYDROMORPHONE HYDROCHLORIDE 1 MG/ML
0.2 INJECTION, SOLUTION INTRAMUSCULAR; INTRAVENOUS; SUBCUTANEOUS
Status: DISCONTINUED | OUTPATIENT
Start: 2025-01-13 | End: 2025-01-13 | Stop reason: HOSPADM

## 2025-01-13 RX ORDER — MAGNESIUM HYDROXIDE 1200 MG/15ML
LIQUID ORAL
Status: COMPLETED | OUTPATIENT
Start: 2025-01-13 | End: 2025-01-13

## 2025-01-13 RX ORDER — DEXAMETHASONE SODIUM PHOSPHATE 4 MG/ML
INJECTION, SOLUTION INTRA-ARTICULAR; INTRALESIONAL; INTRAMUSCULAR; INTRAVENOUS; SOFT TISSUE PRN
Status: DISCONTINUED | OUTPATIENT
Start: 2025-01-13 | End: 2025-01-13 | Stop reason: SURG

## 2025-01-13 RX ORDER — ROPIVACAINE HYDROCHLORIDE 5 MG/ML
INJECTION, SOLUTION EPIDURAL; INFILTRATION; PERINEURAL
Status: DISCONTINUED | OUTPATIENT
Start: 2025-01-13 | End: 2025-01-13 | Stop reason: HOSPADM

## 2025-01-13 RX ORDER — OXYCODONE HCL 5 MG/5 ML
10 SOLUTION, ORAL ORAL
Status: DISCONTINUED | OUTPATIENT
Start: 2025-01-13 | End: 2025-01-13 | Stop reason: HOSPADM

## 2025-01-13 RX ORDER — HYDROMORPHONE HYDROCHLORIDE 1 MG/ML
0.4 INJECTION, SOLUTION INTRAMUSCULAR; INTRAVENOUS; SUBCUTANEOUS
Status: DISCONTINUED | OUTPATIENT
Start: 2025-01-13 | End: 2025-01-13 | Stop reason: HOSPADM

## 2025-01-13 RX ORDER — KETOROLAC TROMETHAMINE 15 MG/ML
INJECTION, SOLUTION INTRAMUSCULAR; INTRAVENOUS PRN
Status: DISCONTINUED | OUTPATIENT
Start: 2025-01-13 | End: 2025-01-13 | Stop reason: SURG

## 2025-01-13 RX ADMIN — SODIUM CHLORIDE, POTASSIUM CHLORIDE, SODIUM LACTATE AND CALCIUM CHLORIDE: 600; 310; 30; 20 INJECTION, SOLUTION INTRAVENOUS at 14:25

## 2025-01-13 RX ADMIN — TRANEXAMIC ACID 1000 MG: 100 INJECTION, SOLUTION INTRAVENOUS at 14:37

## 2025-01-13 RX ADMIN — ROCURONIUM BROMIDE 50 MG: 10 INJECTION, SOLUTION INTRAVENOUS at 14:31

## 2025-01-13 RX ADMIN — KETOROLAC TROMETHAMINE 15 MG: 15 INJECTION, SOLUTION INTRAMUSCULAR; INTRAVENOUS at 15:07

## 2025-01-13 RX ADMIN — PROPOFOL 140 MG: 10 INJECTION, EMULSION INTRAVENOUS at 14:31

## 2025-01-13 RX ADMIN — CEFAZOLIN 2 G: 1 INJECTION, POWDER, FOR SOLUTION INTRAMUSCULAR; INTRAVENOUS at 14:32

## 2025-01-13 RX ADMIN — SUGAMMADEX 200 MG: 100 INJECTION, SOLUTION INTRAVENOUS at 15:07

## 2025-01-13 RX ADMIN — MIDAZOLAM HYDROCHLORIDE 2 MG: 1 INJECTION, SOLUTION INTRAMUSCULAR; INTRAVENOUS at 14:25

## 2025-01-13 RX ADMIN — LIDOCAINE HYDROCHLORIDE 40 MG: 20 INJECTION, SOLUTION EPIDURAL; INFILTRATION; INTRACAUDAL; PERINEURAL at 14:31

## 2025-01-13 RX ADMIN — DEXAMETHASONE SODIUM PHOSPHATE 8 MG: 4 INJECTION INTRA-ARTICULAR; INTRALESIONAL; INTRAMUSCULAR; INTRAVENOUS; SOFT TISSUE at 14:35

## 2025-01-13 RX ADMIN — ONDANSETRON 4 MG: 2 INJECTION INTRAMUSCULAR; INTRAVENOUS at 15:07

## 2025-01-13 RX ADMIN — METOPROLOL TARTRATE 5 MG: 5 INJECTION INTRAVENOUS at 14:58

## 2025-01-13 RX ADMIN — FENTANYL CITRATE 100 MCG: 50 INJECTION, SOLUTION INTRAMUSCULAR; INTRAVENOUS at 14:25

## 2025-01-13 ASSESSMENT — PAIN SCALES - GENERAL: PAIN_LEVEL: 0

## 2025-01-13 ASSESSMENT — FIBROSIS 4 INDEX: FIB4 SCORE: 1.54

## 2025-01-13 NOTE — ANESTHESIA PROCEDURE NOTES
Airway    Date/Time: 1/13/2025 2:31 PM    Performed by: Fortino Berman III, M.D.  Authorized by: Fortino Berman III, M.D.    Location:  OR  Urgency:  Elective  Difficult Airway: No    Indications for Airway Management:  Anesthesia      Spontaneous Ventilation: absent    Sedation Level:  Deep  Preoxygenated: Yes    Final Airway Type:  Supraglottic airway  Final Supraglottic Airway:  Standard LMA    SGA Size:  3  Number of Attempts at Approach:  1

## 2025-01-13 NOTE — OR NURSING
1519 PT RECEIVED IN PACU, REPORT RECEIVED.  VSS, RESP SPONT, EVEN, NON LABORED.    1530 VSS    1545 VSS. PT DENIES PAIN, NAUSEA. TOLERATING PO FLUIDS.     1549 PT MEETS CRITERIA FOR STAGE 2.

## 2025-01-13 NOTE — ANESTHESIA PREPROCEDURE EVALUATION
Case: [id number] Date/Time: 01/13/25 4805    Procedure: LEFT KNEE ARTHROSCOPY WITH POSTERIOR ROOT MEDIAL MENISCAL REPAIR    Pre-op diagnosis: TEAR OF MEDIAL MENISCUS OF KNEE    Location: [facility] OR 03 / SURGERY [facility]    Surgeons: Reji Lugo M.D.            Relevant Problems   NEURO   (positive) Chronic paroxysmal hemicrania, not intractable      CARDIAC   (positive) Primary hypertension       Physical Exam    Airway   Mallampati: II  TM distance: >3 FB  Neck ROM: full       Cardiovascular - normal exam  Rhythm: regular  Rate: normal  (-) murmur     Dental - normal exam           Pulmonary - normal exam  Breath sounds clear to auscultation     Abdominal    Neurological - normal exam                   Anesthesia Plan    ASA 2       Plan - general       Airway plan will be LMA          Induction: intravenous    Postoperative Plan: Postoperative administration of opioids is intended.    Pertinent diagnostic labs and testing reviewed    Informed Consent:    Anesthetic plan and risks discussed with patient.    Use of blood products discussed with: patient whom consented to blood products.

## 2025-01-13 NOTE — DISCHARGE INSTRUCTIONS
If any questions arise, call your provider.  If your provider is not available, please feel free to call the Surgical Center at [phone].    MEDICATIONS: Resume taking daily medication.  Take prescribed pain medication with food.  If no medication is prescribed, you may take non-aspirin pain medication if needed.  PAIN MEDICATION CAN BE VERY CONSTIPATING.  Take a stool softener or laxative such as senokot, pericolace, or milk of magnesia if needed.    NO ORAL PAIN MEDICATION GIVEN IN RECOVERY.    What to Expect Post Anesthesia    Rest and take it easy for the first 24 hours.  A responsible adult is recommended to remain with you during that time.  It is normal to feel sleepy.  We encourage you to not do anything that requires balance, judgment or coordination.    FOR 24 HOURS DO NOT:  Drive, operate machinery or run household appliances.  Drink beer or alcoholic beverages.  Make important decisions or sign legal documents.    To avoid nausea, slowly advance diet as tolerated, avoiding spicy or greasy foods for the first day.  Add more substantial food to your diet according to your provider's instructions.  Babies can be fed formula or breast milk as soon as they are hungry.  INCREASE FLUIDS AND FIBER TO AVOID CONSTIPATION.    MILD FLU-LIKE SYMPTOMS ARE NORMAL.  YOU MAY EXPERIENCE GENERALIZED MUSCLE ACHES, THROAT IRRITATION, HEADACHE AND/OR SOME NAUSEA.    Diet    Thomas Diet    A Thomas Diet consists of foods that are often soft and do not have a lot of fat, fiber, or extra seasonings.  Examples include, but are not limited to bananas, rice, applesauce and toast.  Foods without fat, fiber, or seasoning are easier for the body to digest. They are also less likely to irritate your mouth, throat, stomach, and other parts of your digestive system. You may gradually resume your normal diet after 24 to 48 hours.    POST OPERATIVE INSTRUCTIONS:    WEIGHT BEARING AS TOLERATED TO LEFT LOWER EXTREMITY WITH IMMOBILIZER ON  AND CRUTCHES AS NEEDED.  PATIENT MAY REMOVE IMMOBILIZER WHEN SHE FEELS IT IS NO LONGER NEEDED.  SHOWER ON POST OPERATIVE DAY #2 WITH WOUND COVERED.

## 2025-01-14 NOTE — OR NURSING
1609: Pt arrived to stage 2 via gurney and getting dressed self. Pt denies pain and nausea. L knee dressing CDI, pedal pulse +2. Eren hose in place, and immobilizer on LLE.     1634: Family at bedside    1645:Patient education completed, family denies further questions.DC'd to care of family post uneventful stay in PACU 2. IV discontinued.     1650 :Pt taken out via wc and placed into care of family.

## 2025-01-14 NOTE — ANESTHESIA POSTPROCEDURE EVALUATION
Patient: Yaz Naylor    Procedure Summary       Date: 01/13/25 Room / Location:  OR 03 / SURGERY [facility]    Anesthesia Start: 1424 Anesthesia Stop: 1521    Procedure: LEFT KNEE ARTHROSCOPY WITH POSTERIOR ROOT MEDIAL MENISCAL REPAIR (Left: Knee) Diagnosis: (TEAR OF MEDIAL MENISCUS OF KNEE)    Surgeons: Reji Lugo M.D. Responsible Provider: Fortino Berman III, M.D.    Anesthesia Type: general ASA Status: 2            Final Anesthesia Type: general  Last vitals  BP   Blood Pressure : (!) 148/73    Temp   36.3 °C (97.3 °F)    Pulse   66   Resp   16    SpO2   96 %      Anesthesia Post Evaluation    Patient location during evaluation: PACU  Patient participation: complete - patient participated  Level of consciousness: awake and alert  Pain score: 0    Airway patency: patent  Anesthetic complications: no  Cardiovascular status: hemodynamically stable  Respiratory status: acceptable  Hydration status: euvolemic    PONV: none          There were no known notable events for this encounter.     Nurse Pain Score: 0 (NPRS)

## 2025-01-14 NOTE — OP REPORT
DATE OF SERVICE:  01/13/2025     PREOPERATIVE DIAGNOSIS:  Left knee posterior root medial meniscal tear.     POSTOPERATIVE DIAGNOSES:  1.  Left knee posterior root medial meniscal tear.  2.  Left knee lateral meniscal tear.  3.  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     PROCEDURES:  Left knee arthroscopy with partial medial and lateral   meniscectomy.     SURGEON:  Reji Lugo MD     ANESTHESIA:  General.     COMPLICATIONS:  None.     ESTIMATED BLOOD LOSS:  Minimal.     TOURNIQUET:  None.     INDICATIONS:  The patient is a 69-year-old woman who sustained a left knee   injury several weeks ago and has ongoing medial pain refractory to   conservative management.  MRI shows posterior root medial meniscal tear.  She   is indicated for arthroscopic management.     FINDINGS:  Exam under anesthesia revealed a moderate effusion, full range of   motion.  Ligamentous exam is stable.  Arthroscopic examination of the   suprapatellar pouch and medial and lateral gutters was unremarkable.    Examination of the patella revealed diffuse grade II chondrosis.  There was   grade III central trochlear chondrosis.  Examination of the notch revealed the   cruciate ligaments to be intact.  Examination of the lateral compartment   revealed mild grade II chondrosis.  There was a loose flap on the free edge of   the anterior horn of the lateral meniscus.  Examination of the medial   compartment revealed diffuse grade III chondrosis of the weightbearing medial   femoral condyle.  There was a radial tear involving the posterior root of the   medial meniscus that went through the substance of the meniscus rather than   being an avulsion.  There was some small loose flap still attached at the   posterior root attachment and an unstable flap on the other side of the radial   tear.  There was some longitudinal splitting of the posterior horn of the   medial meniscus with some degeneration of the meniscal tissue  and it was not   felt that it would adequately hold suture to allow repair.     DESCRIPTION OF PROCEDURE:  Following induction of general anesthesia, time-out   was performed confirming patient, site, and procedure.  Two grams of Ancef IV   and a gram of TXA IV were ordered and administered.  Left thigh tourniquet   and thigh middleton were applied and the right leg was placed in a well leg   middleton.  Under sterile conditions, the left knee was injected with 30 mL of   0.25% plain Marcaine in the standard fashion.  The left lower extremity was   then prepped and draped in the usual fashion.  Standard anterolateral and   anteromedial portals with superolateral outflow were established and   diagnostic arthroscopy was performed with findings as above.  The shaver was   used to resect the small flap from the anterior horn of the lateral meniscus.    Next, the shaver was used to debride the posterior horn medial meniscal tear   site removing the loose flap based on the root and debriding the unstable flap   adjacent to the tear.  A tissue grasper was used to determine that the tissue   quality was not adequate for repair and it was debrided to a stable   configuration.  The arthroscope was withdrawn.  The portals were closed with   nylon sutures.  30 mL of 0.25% plain Marcaine was injected into the joint.    Sterile dressing was applied followed by SHELLY hose and knee immobilizer.  The   patient was awakened and extubated in the operating room and transferred to   recovery room in stable condition.        ______________________________  MD JOVANNA Das/ROHAN    DD:  01/13/2025 15:21  DT:  01/13/2025 16:35    Job#:  [id number]

## 2025-01-14 NOTE — ANESTHESIA TIME REPORT
Anesthesia Start and Stop Event Times       Date Time Event    1/13/2025 1408 Ready for Procedure     1424 Anesthesia Start     1521 Anesthesia Stop          Responsible Staff  01/13/25      Name Role Begin End    Fortino Berman III, M.D. Anesth 1424 1521          Overtime Reason:  no overtime (within assigned shift)    Comments:

## 2025-01-28 RX ORDER — LOSARTAN POTASSIUM 100 MG/1
100 TABLET ORAL
Qty: 100 TABLET | Refills: 3 | Status: SHIPPED | OUTPATIENT
Start: 2025-01-28

## 2025-02-17 ENCOUNTER — PATIENT MESSAGE (OUTPATIENT)
Dept: HEALTH INFORMATION MANAGEMENT | Facility: OTHER | Age: 70
End: 2025-02-17

## 2025-06-10 ENCOUNTER — APPOINTMENT (OUTPATIENT)
Dept: MEDICAL GROUP | Facility: PHYSICIAN GROUP | Age: 70
End: 2025-06-10
Payer: MEDICARE

## 2025-06-10 VITALS
SYSTOLIC BLOOD PRESSURE: 122 MMHG | WEIGHT: 147.1 LBS | DIASTOLIC BLOOD PRESSURE: 78 MMHG | BODY MASS INDEX: 22.29 KG/M2 | OXYGEN SATURATION: 97 % | HEART RATE: 73 BPM | RESPIRATION RATE: 14 BRPM | HEIGHT: 68 IN | TEMPERATURE: 98 F

## 2025-06-10 DIAGNOSIS — S83.242D ACUTE MEDIAL MENISCAL TEAR, LEFT, SUBSEQUENT ENCOUNTER: ICD-10-CM

## 2025-06-10 DIAGNOSIS — R73.03 PREDIABETES: Primary | ICD-10-CM

## 2025-06-10 DIAGNOSIS — I10 PRIMARY HYPERTENSION: ICD-10-CM

## 2025-06-10 DIAGNOSIS — C44.91 CANCER OF THE SKIN, BASAL CELL: ICD-10-CM

## 2025-06-10 LAB
HBA1C MFR BLD: 6.2 % (ref ?–5.8)
POCT INT CON NEG: NEGATIVE
POCT INT CON POS: POSITIVE

## 2025-06-10 PROCEDURE — 3078F DIAST BP <80 MM HG: CPT | Performed by: INTERNAL MEDICINE

## 2025-06-10 PROCEDURE — 99214 OFFICE O/P EST MOD 30 MIN: CPT | Performed by: INTERNAL MEDICINE

## 2025-06-10 PROCEDURE — 83036 HEMOGLOBIN GLYCOSYLATED A1C: CPT | Performed by: INTERNAL MEDICINE

## 2025-06-10 PROCEDURE — 3074F SYST BP LT 130 MM HG: CPT | Performed by: INTERNAL MEDICINE

## 2025-06-10 ASSESSMENT — PATIENT HEALTH QUESTIONNAIRE - PHQ9: CLINICAL INTERPRETATION OF PHQ2 SCORE: 0

## 2025-06-10 ASSESSMENT — FIBROSIS 4 INDEX: FIB4 SCORE: 1.54

## 2025-06-10 NOTE — PROGRESS NOTES
CC: 6-month follow-up visit, prediabetes.    HPI:  Yaz presents with the following    1. Prediabetes  11/5/2024:Follow-up A1c 6.1, slightly above from 6.0. Patient has been taking her berberine 500 mg daily for the last year. She is eating a healthy mindful diet. Has not been exercising regularly however tries to walk every day. Positive family history of diabetes in her mom and diagnosed in her 80s. Acute MI in her mom and grandfather.     6/10/2025:.  Follow-up hemoglobin A1c 6.2.  Patient follows a very healthy diet and lifestyle.  Takes berberine and cinnamon every morning.    2. Primary hypertension  Blood pressure well-controlled with losartan 100 mg daily.    3. Cancer of the skin, basal cell  Patient was recently diagnosed with basal cell skin cancer in her right upper extremity, seen by Dr. Kenzie Keating.    4. Acute medial meniscal tear, left, subsequent encounter  In January of this year, patient stepped off a curb and was diagnosed with a left meniscal tear.  Patient underwent arthroscopy with Dr. Lugo and completed 6 weeks of physical therapy.  Patient states that she is 95% healed.  Denies any pain issues.      Patient Active Problem List    Diagnosis Date Noted    Cancer of the skin, basal cell 06/10/2025    Acute medial meniscal tear, left, subsequent encounter 06/10/2025    Diverticulosis 07/24/2024    Submucous leiomyoma of uterus 10/30/2023    Colitis 10/26/2023    Abnormal CT scan of the abdomen concerning for uterine/pelvic mass 10/26/2023    Dyslipidemia 10/10/2023    Osteopenia of lumbar spine 03/02/2023    Prediabetes 06/07/2022    Primary hypertension 03/11/2022    Chronic paroxysmal hemicrania, not intractable 03/11/2022    H/O sarcoidosis 03/11/2022    Vitamin D deficiency 03/11/2022    Visual changes 03/11/2022       Current Medications[1]      Allergies as of 06/10/2025 - Reviewed 06/10/2025   Allergen Reaction Noted    Iodine Hives 03/11/2022    Penicillins Hives 03/11/2022  "       Social History     Socioeconomic History    Marital status:      Spouse name: Not on file    Number of children: Not on file    Years of education: Not on file    Highest education level: Not on file   Occupational History    Not on file   Tobacco Use    Smoking status: Never    Smokeless tobacco: Never   Vaping Use    Vaping status: Never Used   Substance and Sexual Activity    Alcohol use: Yes     Alcohol/week: 0.6 oz     Types: 1 Standard drinks or equivalent per week     Comment: O[facility]    Drug use: Never    Sexual activity: Not on file   Other Topics Concern    Not on file   Social History Narrative    Not on file     Social Drivers of Health     Financial Resource Strain: Not on file   Food Insecurity: Not on file   Transportation Needs: Not on file   Physical Activity: Not on file   Stress: Not on file   Social Connections: Not on file   Intimate Partner Violence: Not on file   Housing Stability: Not on file       Family History   Problem Relation Age of Onset    Diabetes Mother     Heart Disease Mother     Hypertension Mother     Hyperlipidemia Mother        Past Surgical History[2]    ROS:  Denies any Headache,Chest pain,  Shortness of breath,  Abdominal pain, Changes of bowel or bladder, Lower ext edema, Fevers, Nights sweats, Weight Changes, Focal weakness or numbness.  All other systems are negative.    /78 (BP Location: Left arm, Patient Position: Sitting)   Pulse 73   Temp 36.7 °C (98 °F) (Temporal)   Resp 14   Ht 1.727 m (5' 8\")   Wt 66.7 kg (147 lb 1.6 oz)   SpO2 97%   BMI 22.37 kg/m²      Constitutional: Alert, no distress, well-groomed.  Skin: Warm, dry, good turgor, no rashes in visible areas.  Eye: Equal, round and reactive, conjunctiva clear, lids normal.  ENMT: Lips without lesions, good dentition, moist mucous membranes.  Neck: Trachea midline, no masses, no thyromegaly.  Respiratory: Unlabored respiratory effort, no cough.  Abdomen: Soft, no gross masses.  MSK: Normal " gait, moves all extremities.  Neuro: Grossly non-focal. No cranial nerve deficit. Strength and sensation intact.   Psych: Alert and oriented x3, normal affect and mood.      Assessment and Plan.   69 y.o. female presenting with the following.     1. Prediabetes (Primary)  Stable.  Continue current diet lifestyle modifications.  Continue supplementing with berberine and cinnamon.  Monitor hemoglobin A1c every 6 months.  - POCT Hemoglobin A1C    2. Primary hypertension  Continue losartan 100 mg daily.    3. Cancer of the skin, basal cell  Close follow-up with dermatology.    4. Acute medial meniscal tear, left, subsequent encounter  Stable.  Resolved.    Annual wellness next visit.         [1]   Current Outpatient Medications   Medication Sig Dispense Refill    losartan (COZAAR) 100 MG Tab Take 1 Tablet by mouth every day. 100 Tablet 3    BERBERINE CHLORIDE PO Take  by mouth.      acetaminophen (TYLENOL) 500 MG Tab Take 500-1,000 mg by mouth every 6 hours as needed for Moderate Pain.      MAGNESIUM PO Take 1 Tablet by mouth every morning.      multivitamin (THERAGRAN) Tab Take 1 Tablet by mouth every morning.      Omega-3 Fatty Acids (OMEGA 3 PO) Take 1 Capsule by mouth every morning.      CALCIUM PO Take 2 Tablets by mouth every morning.       No current facility-administered medications for this visit.   [2]   Past Surgical History:  Procedure Laterality Date    PB KNEE SCOPE,DIAGNOSTIC Left 1/13/2025    Procedure: LEFT KNEE ARTHROSCOPY WITH POSTERIOR ROOT MEDIAL MENISCAL REPAIR;  Surgeon: Reji Lugo M.D.;  Location: SURGERY [facility];  Service: Orthopedics    DILATION AND CURETTAGE  2004    BIOPSY GENERAL Left 1988    salivary gland    ANKLE LIGAMENT RECONSTRUCTION Right 1975    COLONOSCOPY

## 2025-07-18 ENCOUNTER — APPOINTMENT (OUTPATIENT)
Dept: DERMATOLOGY | Facility: IMAGING CENTER | Age: 70
End: 2025-07-18

## 2025-08-01 ENCOUNTER — APPOINTMENT (OUTPATIENT)
Dept: DERMATOLOGY | Facility: IMAGING CENTER | Age: 70
End: 2025-08-01

## (undated) DEVICE — SPONGE GAUZESTER 4 X 4 4PLY - (128PK/CA)

## (undated) DEVICE — TOWEL STOP TIMEOUT SAFETY FLAG (40EA/CA)

## (undated) DEVICE — DRAPE LARGE 3 QUARTER - (20/CA)

## (undated) DEVICE — GOWN SURGICAL X-LARGE ULTRA - FILM-REINFORCED (20/CA)

## (undated) DEVICE — DRAPE U ORTHOPEDIC - (10/BX)

## (undated) DEVICE — TUBE CONNECTING SUCTION - CLEAR PLASTIC STERILE 72 IN (50EA/CA)

## (undated) DEVICE — SUTURE 3-0 ETHILON FS-1 - (36/BX) 30 INCH

## (undated) DEVICE — DRAPE MAYO STAND - (30/CA)

## (undated) DEVICE — DRAPE IOBAN II INCISE 23X17 - (10EA/BX 4BX/CA)

## (undated) DEVICE — SYRINGE 30 ML LL (56/BX)

## (undated) DEVICE — SODIUM CHL. IRRIGATION 0.9% 3000ML (4EA/CA 65CA/PF)

## (undated) DEVICE — SHAVER4.0 AGGRESSIVE + FORMLA (5EA/BX)

## (undated) DEVICE — GLOVE BIOGEL SZ 8 SURGICAL PF LTX - (50PR/BX 4BX/CA)

## (undated) DEVICE — GLOVE BIOGEL INDICATOR SZ 8 SURGICAL PF LTX - (50/BX 4BX/CA)

## (undated) DEVICE — SENSOR OXIMETER ADULT SPO2 RD SET (20EA/BX)

## (undated) DEVICE — NEEDLE SAFETY 18 GA X 1 1/2 IN (100EA/BX)

## (undated) DEVICE — TUBING DAY USE W/CARTRIDGE (1EA) ORDER MULTIPLES OF 10

## (undated) DEVICE — TUBING CASSETTE CROSSFLOW INTEGRATED (1/EA) ORDER MULTIPLES OF 10